# Patient Record
Sex: FEMALE | NOT HISPANIC OR LATINO | Employment: FULL TIME | URBAN - METROPOLITAN AREA
[De-identification: names, ages, dates, MRNs, and addresses within clinical notes are randomized per-mention and may not be internally consistent; named-entity substitution may affect disease eponyms.]

---

## 2018-10-17 ENCOUNTER — HOSPITAL ENCOUNTER (OUTPATIENT)
Dept: RADIOLOGY | Facility: MEDICAL CENTER | Age: 38
End: 2018-10-17

## 2018-10-17 ENCOUNTER — APPOINTMENT (OUTPATIENT)
Dept: RADIOLOGY | Facility: MEDICAL CENTER | Age: 38
DRG: 540 | End: 2018-10-17
Attending: INTERNAL MEDICINE
Payer: MEDICAID

## 2018-10-17 ENCOUNTER — HOSPITAL ENCOUNTER (INPATIENT)
Facility: MEDICAL CENTER | Age: 38
LOS: 4 days | DRG: 540 | End: 2018-10-21
Attending: HOSPITALIST | Admitting: HOSPITALIST
Payer: MEDICAID

## 2018-10-17 ENCOUNTER — HOSPITAL ENCOUNTER (OUTPATIENT)
Facility: MEDICAL CENTER | Age: 38
DRG: 540 | End: 2018-10-17
Admitting: HOSPITALIST
Payer: MEDICAID

## 2018-10-17 VITALS — BODY MASS INDEX: 36.51 KG/M2 | WEIGHT: 213.85 LBS | HEIGHT: 64 IN

## 2018-10-17 PROCEDURE — 770006 HCHG ROOM/CARE - MED/SURG/GYN SEMI*

## 2018-10-17 PROCEDURE — 94760 N-INVAS EAR/PLS OXIMETRY 1: CPT

## 2018-10-17 PROCEDURE — 700101 HCHG RX REV CODE 250: Performed by: INTERNAL MEDICINE

## 2018-10-17 PROCEDURE — 73130 X-RAY EXAM OF HAND: CPT | Mod: LT

## 2018-10-17 PROCEDURE — 700105 HCHG RX REV CODE 258: Performed by: INTERNAL MEDICINE

## 2018-10-17 PROCEDURE — 99223 1ST HOSP IP/OBS HIGH 75: CPT | Performed by: INTERNAL MEDICINE

## 2018-10-17 PROCEDURE — 99358 PROLONG SERVICE W/O CONTACT: CPT | Performed by: INTERNAL MEDICINE

## 2018-10-17 PROCEDURE — 700111 HCHG RX REV CODE 636 W/ 250 OVERRIDE (IP): Performed by: INTERNAL MEDICINE

## 2018-10-17 RX ORDER — OXYCODONE HYDROCHLORIDE 5 MG/1
5 TABLET ORAL
Status: DISCONTINUED | OUTPATIENT
Start: 2018-10-17 | End: 2018-10-21 | Stop reason: HOSPADM

## 2018-10-17 RX ORDER — AMOXICILLIN 250 MG
2 CAPSULE ORAL 2 TIMES DAILY
Status: DISCONTINUED | OUTPATIENT
Start: 2018-10-17 | End: 2018-10-21 | Stop reason: HOSPADM

## 2018-10-17 RX ORDER — SPIRONOLACTONE 25 MG/1
25 TABLET ORAL DAILY
COMMUNITY

## 2018-10-17 RX ORDER — SODIUM HYPOCHLORITE 1.25 MG/ML
SOLUTION TOPICAL ONCE
Status: DISCONTINUED | OUTPATIENT
Start: 2018-10-17 | End: 2018-10-17

## 2018-10-17 RX ORDER — LEVOTHYROXINE SODIUM 0.1 MG/1
200 TABLET ORAL
COMMUNITY

## 2018-10-17 RX ORDER — SODIUM CHLORIDE 9 MG/ML
500 INJECTION, SOLUTION INTRAVENOUS
Status: COMPLETED | OUTPATIENT
Start: 2018-10-17 | End: 2018-10-19

## 2018-10-17 RX ORDER — METRONIDAZOLE 500 MG/1
500 TABLET ORAL 3 TIMES DAILY
Status: ON HOLD | COMMUNITY
End: 2018-10-21

## 2018-10-17 RX ORDER — MORPHINE SULFATE 4 MG/ML
2 INJECTION, SOLUTION INTRAMUSCULAR; INTRAVENOUS
Status: DISCONTINUED | OUTPATIENT
Start: 2018-10-17 | End: 2018-10-21 | Stop reason: HOSPADM

## 2018-10-17 RX ORDER — OMEPRAZOLE 20 MG/1
20 CAPSULE, DELAYED RELEASE ORAL DAILY
COMMUNITY

## 2018-10-17 RX ORDER — SODIUM CHLORIDE 9 MG/ML
INJECTION, SOLUTION INTRAVENOUS CONTINUOUS
Status: DISCONTINUED | OUTPATIENT
Start: 2018-10-17 | End: 2018-10-21

## 2018-10-17 RX ORDER — DEXTROSE MONOHYDRATE 25 G/50ML
25 INJECTION, SOLUTION INTRAVENOUS
Status: DISCONTINUED | OUTPATIENT
Start: 2018-10-17 | End: 2018-10-20

## 2018-10-17 RX ORDER — POLYETHYLENE GLYCOL 3350 17 G/17G
1 POWDER, FOR SOLUTION ORAL
Status: DISCONTINUED | OUTPATIENT
Start: 2018-10-17 | End: 2018-10-21 | Stop reason: HOSPADM

## 2018-10-17 RX ORDER — LEVOTHYROXINE SODIUM 0.05 MG/1
200 TABLET ORAL
Status: DISCONTINUED | OUTPATIENT
Start: 2018-10-18 | End: 2018-10-21 | Stop reason: HOSPADM

## 2018-10-17 RX ORDER — OMEPRAZOLE 20 MG/1
20 CAPSULE, DELAYED RELEASE ORAL DAILY
Status: DISCONTINUED | OUTPATIENT
Start: 2018-10-18 | End: 2018-10-21 | Stop reason: HOSPADM

## 2018-10-17 RX ORDER — BISACODYL 10 MG
10 SUPPOSITORY, RECTAL RECTAL
Status: DISCONTINUED | OUTPATIENT
Start: 2018-10-17 | End: 2018-10-21 | Stop reason: HOSPADM

## 2018-10-17 RX ORDER — OXYCODONE HYDROCHLORIDE 5 MG/1
2.5 TABLET ORAL
Status: DISCONTINUED | OUTPATIENT
Start: 2018-10-17 | End: 2018-10-21 | Stop reason: HOSPADM

## 2018-10-17 RX ORDER — SODIUM HYPOCHLORITE 1.25 MG/ML
SOLUTION TOPICAL 2 TIMES DAILY
Status: DISCONTINUED | OUTPATIENT
Start: 2018-10-17 | End: 2018-10-21 | Stop reason: HOSPADM

## 2018-10-17 RX ORDER — CIPROFLOXACIN 2 MG/ML
400 INJECTION, SOLUTION INTRAVENOUS EVERY 12 HOURS
Status: DISCONTINUED | OUTPATIENT
Start: 2018-10-17 | End: 2018-10-18

## 2018-10-17 RX ORDER — ACETAMINOPHEN 325 MG/1
650 TABLET ORAL EVERY 6 HOURS PRN
Status: DISCONTINUED | OUTPATIENT
Start: 2018-10-17 | End: 2018-10-21 | Stop reason: HOSPADM

## 2018-10-17 RX ADMIN — SODIUM CHLORIDE: 9 INJECTION, SOLUTION INTRAVENOUS at 22:26

## 2018-10-17 RX ADMIN — CIPROFLOXACIN 400 MG: 2 INJECTION, SOLUTION INTRAVENOUS at 22:26

## 2018-10-17 RX ADMIN — MORPHINE SULFATE 2 MG: 4 INJECTION INTRAVENOUS at 22:26

## 2018-10-17 RX ADMIN — DAKIN'S SOLUTION 0.125% (QUARTER STRENGTH) 473 ML: 0.12 SOLUTION at 22:26

## 2018-10-17 ASSESSMENT — PATIENT HEALTH QUESTIONNAIRE - PHQ9
2. FEELING DOWN, DEPRESSED, IRRITABLE, OR HOPELESS: NOT AT ALL
SUM OF ALL RESPONSES TO PHQ9 QUESTIONS 1 AND 2: 0
1. LITTLE INTEREST OR PLEASURE IN DOING THINGS: NOT AT ALL

## 2018-10-17 ASSESSMENT — COGNITIVE AND FUNCTIONAL STATUS - GENERAL
MOBILITY SCORE: 24
SUGGESTED CMS G CODE MODIFIER DAILY ACTIVITY: CH
DAILY ACTIVITIY SCORE: 24
SUGGESTED CMS G CODE MODIFIER MOBILITY: CH

## 2018-10-17 ASSESSMENT — PAIN SCALES - GENERAL: PAINLEVEL_OUTOF10: 8

## 2018-10-17 ASSESSMENT — LIFESTYLE VARIABLES
ALCOHOL_USE: NO
EVER_SMOKED: YES

## 2018-10-17 ASSESSMENT — COPD QUESTIONNAIRES
COPD SCREENING SCORE: 0
IN THE PAST 12 MONTHS DO YOU DO LESS THAN YOU USED TO BECAUSE OF YOUR BREATHING PROBLEMS: DISAGREE/UNSURE
HAVE YOU SMOKED AT LEAST 100 CIGARETTES IN YOUR ENTIRE LIFE: NO/DON'T KNOW
DO YOU EVER COUGH UP ANY MUCUS OR PHLEGM?: NO/ONLY WITH OCCASIONAL COLDS OR INFECTIONS
DURING THE PAST 4 WEEKS HOW MUCH DID YOU FEEL SHORT OF BREATH: NONE/LITTLE OF THE TIME

## 2018-10-17 NOTE — PROGRESS NOTES
Patient is a direct admit from [facility], patient of Dr. Valadez's for osteomyelitis of left hand.  Dr. Nelson is accepting the patient.  Telephone ADT order received and entered into epic on 10/17.  Held orders in epic to be released upon patient arrival to unit.

## 2018-10-18 PROBLEM — E11.9 TYPE 2 DIABETES MELLITUS, WITHOUT LONG-TERM CURRENT USE OF INSULIN (HCC): Status: ACTIVE | Noted: 2018-10-18

## 2018-10-18 PROBLEM — K21.9 GERD (GASTROESOPHAGEAL REFLUX DISEASE): Status: ACTIVE | Noted: 2018-10-18

## 2018-10-18 PROBLEM — M86.9 OSTEOMYELITIS (HCC): Status: ACTIVE | Noted: 2018-10-18

## 2018-10-18 PROBLEM — E66.9 OBESITY: Status: ACTIVE | Noted: 2018-10-18

## 2018-10-18 PROBLEM — E03.9 HYPOTHYROIDISM: Status: ACTIVE | Noted: 2018-10-18

## 2018-10-18 LAB
ANION GAP SERPL CALC-SCNC: 8 MMOL/L (ref 0–11.9)
BASOPHILS # BLD AUTO: 0.5 % (ref 0–1.8)
BASOPHILS # BLD: 0.06 K/UL (ref 0–0.12)
BUN SERPL-MCNC: 9 MG/DL (ref 8–22)
CALCIUM SERPL-MCNC: 8.5 MG/DL (ref 8.5–10.5)
CHLORIDE SERPL-SCNC: 100 MMOL/L (ref 96–112)
CO2 SERPL-SCNC: 28 MMOL/L (ref 20–33)
CREAT SERPL-MCNC: 0.71 MG/DL (ref 0.5–1.4)
CRP SERPL HS-MCNC: 0.81 MG/DL (ref 0–0.75)
EOSINOPHIL # BLD AUTO: 0.41 K/UL (ref 0–0.51)
EOSINOPHIL NFR BLD: 3.2 % (ref 0–6.9)
ERYTHROCYTE [DISTWIDTH] IN BLOOD BY AUTOMATED COUNT: 40.6 FL (ref 35.9–50)
ERYTHROCYTE [SEDIMENTATION RATE] IN BLOOD BY WESTERGREN METHOD: 47 MM/HOUR (ref 0–20)
EST. AVERAGE GLUCOSE BLD GHB EST-MCNC: 295 MG/DL
GLUCOSE BLD-MCNC: 183 MG/DL (ref 65–99)
GLUCOSE BLD-MCNC: 196 MG/DL (ref 65–99)
GLUCOSE SERPL-MCNC: 154 MG/DL (ref 65–99)
HBA1C MFR BLD: 11.9 % (ref 0–5.6)
HCT VFR BLD AUTO: 35.3 % (ref 37–47)
HGB BLD-MCNC: 11.3 G/DL (ref 12–16)
IMM GRANULOCYTES # BLD AUTO: 0.12 K/UL (ref 0–0.11)
IMM GRANULOCYTES NFR BLD AUTO: 0.9 % (ref 0–0.9)
LYMPHOCYTES # BLD AUTO: 2.22 K/UL (ref 1–4.8)
LYMPHOCYTES NFR BLD: 17.4 % (ref 22–41)
MCH RBC QN AUTO: 28 PG (ref 27–33)
MCHC RBC AUTO-ENTMCNC: 32 G/DL (ref 33.6–35)
MCV RBC AUTO: 87.6 FL (ref 81.4–97.8)
MONOCYTES # BLD AUTO: 0.72 K/UL (ref 0–0.85)
MONOCYTES NFR BLD AUTO: 5.7 % (ref 0–13.4)
NEUTROPHILS # BLD AUTO: 9.21 K/UL (ref 2–7.15)
NEUTROPHILS NFR BLD: 72.3 % (ref 44–72)
NRBC # BLD AUTO: 0 K/UL
NRBC BLD-RTO: 0 /100 WBC
PLATELET # BLD AUTO: 312 K/UL (ref 164–446)
PMV BLD AUTO: 10.9 FL (ref 9–12.9)
POTASSIUM SERPL-SCNC: 3.5 MMOL/L (ref 3.6–5.5)
RBC # BLD AUTO: 4.03 M/UL (ref 4.2–5.4)
SODIUM SERPL-SCNC: 136 MMOL/L (ref 135–145)
T4 FREE SERPL-MCNC: 0.76 NG/DL (ref 0.53–1.43)
TSH SERPL DL<=0.005 MIU/L-ACNC: 72.39 UIU/ML (ref 0.38–5.33)
WBC # BLD AUTO: 12.7 K/UL (ref 4.8–10.8)

## 2018-10-18 PROCEDURE — 700102 HCHG RX REV CODE 250 W/ 637 OVERRIDE(OP): Performed by: INTERNAL MEDICINE

## 2018-10-18 PROCEDURE — 82962 GLUCOSE BLOOD TEST: CPT | Mod: 91

## 2018-10-18 PROCEDURE — 85025 COMPLETE CBC W/AUTO DIFF WBC: CPT

## 2018-10-18 PROCEDURE — 85652 RBC SED RATE AUTOMATED: CPT

## 2018-10-18 PROCEDURE — 80048 BASIC METABOLIC PNL TOTAL CA: CPT

## 2018-10-18 PROCEDURE — 36415 COLL VENOUS BLD VENIPUNCTURE: CPT

## 2018-10-18 PROCEDURE — 84443 ASSAY THYROID STIM HORMONE: CPT

## 2018-10-18 PROCEDURE — 700111 HCHG RX REV CODE 636 W/ 250 OVERRIDE (IP): Performed by: INTERNAL MEDICINE

## 2018-10-18 PROCEDURE — A9270 NON-COVERED ITEM OR SERVICE: HCPCS | Performed by: INTERNAL MEDICINE

## 2018-10-18 PROCEDURE — 99233 SBSQ HOSP IP/OBS HIGH 50: CPT | Performed by: INTERNAL MEDICINE

## 2018-10-18 PROCEDURE — 770006 HCHG ROOM/CARE - MED/SURG/GYN SEMI*

## 2018-10-18 PROCEDURE — 83036 HEMOGLOBIN GLYCOSYLATED A1C: CPT

## 2018-10-18 PROCEDURE — 700101 HCHG RX REV CODE 250: Performed by: ORTHOPAEDIC SURGERY

## 2018-10-18 PROCEDURE — 700105 HCHG RX REV CODE 258: Performed by: INTERNAL MEDICINE

## 2018-10-18 PROCEDURE — 86140 C-REACTIVE PROTEIN: CPT

## 2018-10-18 PROCEDURE — 99255 IP/OBS CONSLTJ NEW/EST HI 80: CPT | Performed by: INTERNAL MEDICINE

## 2018-10-18 PROCEDURE — 87040 BLOOD CULTURE FOR BACTERIA: CPT

## 2018-10-18 PROCEDURE — 84439 ASSAY OF FREE THYROXINE: CPT

## 2018-10-18 RX ORDER — SULFAMETHOXAZOLE AND TRIMETHOPRIM 800; 160 MG/1; MG/1
2 TABLET ORAL EVERY 12 HOURS
Status: DISCONTINUED | OUTPATIENT
Start: 2018-10-18 | End: 2018-10-19

## 2018-10-18 RX ORDER — AMOXICILLIN AND CLAVULANATE POTASSIUM 875; 125 MG/1; MG/1
1 TABLET, FILM COATED ORAL EVERY 12 HOURS
Status: DISCONTINUED | OUTPATIENT
Start: 2018-10-18 | End: 2018-10-19

## 2018-10-18 RX ORDER — METRONIDAZOLE 500 MG/1
500 TABLET ORAL 3 TIMES DAILY
Status: DISCONTINUED | OUTPATIENT
Start: 2018-10-18 | End: 2018-10-18

## 2018-10-18 RX ORDER — POTASSIUM CHLORIDE 20 MEQ/1
20 TABLET, EXTENDED RELEASE ORAL DAILY
Status: DISCONTINUED | OUTPATIENT
Start: 2018-10-18 | End: 2018-10-21 | Stop reason: HOSPADM

## 2018-10-18 RX ADMIN — CIPROFLOXACIN 400 MG: 2 INJECTION, SOLUTION INTRAVENOUS at 09:39

## 2018-10-18 RX ADMIN — MORPHINE SULFATE 2 MG: 4 INJECTION INTRAVENOUS at 04:06

## 2018-10-18 RX ADMIN — INSULIN HUMAN 1 UNITS: 100 INJECTION, SOLUTION PARENTERAL at 17:48

## 2018-10-18 RX ADMIN — LEVOTHYROXINE SODIUM 200 MCG: 50 TABLET ORAL at 05:17

## 2018-10-18 RX ADMIN — DAKIN'S SOLUTION 0.125% (QUARTER STRENGTH) 473 ML: 0.12 SOLUTION at 17:43

## 2018-10-18 RX ADMIN — INSULIN HUMAN 1 UNITS: 100 INJECTION, SOLUTION PARENTERAL at 23:30

## 2018-10-18 RX ADMIN — DAKIN'S SOLUTION 0.125% (QUARTER STRENGTH) 473 ML: 0.12 SOLUTION at 05:18

## 2018-10-18 RX ADMIN — OMEPRAZOLE 20 MG: 20 CAPSULE, DELAYED RELEASE ORAL at 05:17

## 2018-10-18 RX ADMIN — OXYCODONE HYDROCHLORIDE 5 MG: 5 TABLET ORAL at 17:43

## 2018-10-18 RX ADMIN — SODIUM CHLORIDE: 9 INJECTION, SOLUTION INTRAVENOUS at 09:39

## 2018-10-18 RX ADMIN — AMOXICILLIN AND CLAVULANATE POTASSIUM 1 TABLET: 875; 125 TABLET, FILM COATED ORAL at 17:43

## 2018-10-18 RX ADMIN — INSULIN HUMAN 1 UNITS: 100 INJECTION, SOLUTION PARENTERAL at 11:52

## 2018-10-18 RX ADMIN — POTASSIUM CHLORIDE 20 MEQ: 1500 TABLET, EXTENDED RELEASE ORAL at 09:39

## 2018-10-18 RX ADMIN — INSULIN HUMAN 1 UNITS: 100 INJECTION, SOLUTION PARENTERAL at 00:05

## 2018-10-18 RX ADMIN — SULFAMETHOXAZOLE AND TRIMETHOPRIM 2 TABLET: 800; 160 TABLET ORAL at 17:43

## 2018-10-18 ASSESSMENT — ENCOUNTER SYMPTOMS
SEIZURES: 0
DIZZINESS: 0
SENSORY CHANGE: 0
MEMORY LOSS: 0
MYALGIAS: 0
BLOOD IN STOOL: 0
NECK PAIN: 0
FEVER: 0
SPUTUM PRODUCTION: 0
PALPITATIONS: 0
BLURRED VISION: 0
VOMITING: 0
BACK PAIN: 0
HEADACHES: 0
SHORTNESS OF BREATH: 0
NAUSEA: 0
DEPRESSION: 0
SORE THROAT: 0
CHILLS: 0
COUGH: 0
DOUBLE VISION: 0
BRUISES/BLEEDS EASILY: 0
ABDOMINAL PAIN: 0
SPEECH CHANGE: 0
DIARRHEA: 0
MYALGIAS: 1
WEAKNESS: 1
FLANK PAIN: 0
DIAPHORESIS: 0
WHEEZING: 0
FOCAL WEAKNESS: 0
NERVOUS/ANXIOUS: 0

## 2018-10-18 ASSESSMENT — PAIN SCALES - GENERAL
PAINLEVEL_OUTOF10: 5
PAINLEVEL_OUTOF10: 5
PAINLEVEL_OUTOF10: 4
PAINLEVEL_OUTOF10: 8

## 2018-10-18 NOTE — DIETARY
"Nutrition services: Day 1 of admit.  Fabi Rudd is a 38 y.o. female with admitting DX of Osteomyelitis.    Unplanned wt loss noted on admit screen.  Spoke with pt at bedside.  Pt appeared well nourished and was sitting up in bed drawing. Pt reports a good appetite.  Pt unsure of UBW, but states that she feels her wt has been fluctuating 5-10 lbs based on how her clothing has been fitting.  Discussed snacks with pt and added to daily menu TID per pt request.  Also discussed the importance of maintaining steady CHO intake throughout the day to help maintain glucose control.     Assessment:  Height: 160 cm (5' 3\")  Weight: 100.6 kg (221 lb 12.5 oz)  Body mass index is 39.29 kg/m².  Diet/Intake: Diabetic, Regular; PO 50-75% for one meal thus far    Evaluation:   1. Potassium 3.5, Glucose 154, HbA1c 11.9 (10/18)  2. Pertinent meds:  Augmentin, SSI, Synthroid, Prilosec, Kdur, Pericolace    Recommendations/Plan:  1. Encourage intake of meals and snacks.  2. Document intake of all meals and snacks as % taken in ADLs to provide interdisciplinary communication across all shifts.   3. Monitor weight.  4. Nutrition rep will continue to see patient for ongoing meal and snack preferences.             "

## 2018-10-18 NOTE — PROGRESS NOTES
"Progress Note     Interval changes:improved.  Pain and swelling decreased.  Improvement in erythema.     ROS - Patient denies any new issues. No chest pain, dyspnea, or fever.  Pain well controlled.   /73   Pulse 66   Temp 36.4 °C (97.6 °F)   Resp 16   Ht 1.6 m (5' 3\")   Wt 100.6 kg (221 lb 12.5 oz)   LMP 09/15/2018 (Exact Date)   SpO2 97%   Breastfeeding? No   BMI 39.29 kg/m²      Patient seen and examined  No acute distress  Breathing non labored  RRR  L ring finger: dressing clean and dry.  Interval improvement in the erythema.  Wound shows no significant erythema, no purulence  +FDS/FDP  Recent Labs      10/18/18   0347   WBC  12.7*   RBC  4.03*   HEMOGLOBIN  11.3*   HEMATOCRIT  35.3*   MCV  87.6   MCH  28.0   RDW  40.6   PLATELETCT  312   MPV  10.9   NEUTSPOLYS  72.30*   LYMPHOCYTES  17.40*   MONOCYTES  5.70   EOSINOPHILS  3.20   BASOPHILS  0.50     Recent Labs      10/18/18   0347   SODIUM  136   POTASSIUM  3.5*   CHLORIDE  100   CO2  28   GLUCOSE  154*   BUN  9        A/P: Acute osteomyelitis of the distal phalanx  - pain control  BID dakins soaks  Wound care recommendations  ID recommendations  If she continues to improve, will not need further surgery.  Will need to establish wound care for patient on discharge.    NPO at midnight tonight.        Patient Active Problem List   Diagnosis   • Osteomyelitis (HCC)   • Type 2 diabetes mellitus, without long-term current use of insulin (HCC)   • Hypothyroidism   • Obesity   • GERD (gastroesophageal reflux disease)     "

## 2018-10-18 NOTE — ASSESSMENT & PLAN NOTE
Body mass index is 39.29 kg/m².  Pt educated on the increase of morbidity and mortality associated with excess weight including DM, Heart Disease, HTN, stroke, and sleep apnea.  Pt advised weight loss of 5% through reduced calorie, low carb diet and 150 mins of exercise a week

## 2018-10-18 NOTE — PROGRESS NOTES
Patient arrived to T407-1. Admitting notified. Consent to treat signed, armband placed on patient. PIV already established upon arrival, flushed and it is patent. Height and weight obtained. Admit profile and med rec completed. Dr. Mobley, admitting hospitalist, paged for direct admission.

## 2018-10-18 NOTE — CONSULTS
[facility] INFECTIOUS DISEASES INPATIENT CONSULT NOTE     Date of Service: 10/18/2018    Consult Requested By: Jessica Peter D.O.    Reason for Consultation: Finger osteomyelitis    Chief Complaint: Finger infection    History of Present Illness:     Fabi Rudd is a 38 y.o. female admitted 10/17/2018.  Patient has a history of poorly controlled diabetes.  Pt presented to [facility] on 10/12 with 2 weeks of right ring finger swelling that began with a hangnail.  PCP put her on amoxicillin which resulted in no improvement.  Patient reported that the swelling extended approximately up to a third of her forearm.  A limited I&D was performed in the ER on 10/12.  She refused surgery referral at the time.  MRI showed abscess of the finger with underlying osteomyelitis.    ER with a right ring finger abscess.  She reports that about 13 days ago, she noted a hangnail that was complicated by infection.  She reported that the swelling progressed proximally involving up to a third of her forearm.    Patient was seen by Ortho, noted that she may not need surgical intervention.  Patient prefers to not undergo amputation.  Wound culture from the outside hospital grew Staphylococcus hominis, methicillin-resistant, resistant to Augmentin, Unasyn, ceftriaxone.  Sensitive to Clinda, Dapto, Zyvox, tetracycline, Bactrim, vanc.    Patient was transferred to [facility] due to concern that she may need amputation.    Review of Systems:  All other systems reviewed and are negative expect as noted in HPI    Past Medical History:   Diagnosis Date   • Diabetes mellitus (HCC)        No past surgical history on file.    Family history  No similar illness in family    Social History     Social History   • Marital status: Single     Spouse name: N/A   • Number of children: N/A   • Years of education: N/A     Occupational History   • Not on file.     Social History Main Topics   • Smoking status: Former Smoker     Packs/day: 0.00    • Smokeless tobacco: Never Used   • Alcohol use Not on file   • Drug use: Unknown   • Sexual activity: Not on file     Other Topics Concern   • Not on file     Social History Narrative   • No narrative on file       No Known Allergies    Medications:    Current Facility-Administered Medications:   •  potassium chloride SA (Kdur) tablet 20 mEq, 20 mEq, Oral, DAILY, Jessica Peter D.O., 20 mEq at 10/18/18 0939  •  sulfamethoxazole-trimethoprim (BACTRIM DS) 800-160 MG tablet 2 Tab, 2 Tab, Oral, Q12HRS, Ignacio Long M.D.  •  amoxicillin-clavulanate (AUGMENTIN) 875-125 MG per tablet 1 Tab, 1 Tab, Oral, Q12HRS, Ignacio Long M.D.  •  senna-docusate (PERICOLACE or SENOKOT S) 8.6-50 MG per tablet 2 Tab, 2 Tab, Oral, BID **AND** polyethylene glycol/lytes (MIRALAX) PACKET 1 Packet, 1 Packet, Oral, QDAY PRN **AND** magnesium hydroxide (MILK OF MAGNESIA) suspension 30 mL, 30 mL, Oral, QDAY PRN **AND** bisacodyl (DULCOLAX) suppository 10 mg, 10 mg, Rectal, QDAY PRN, Isai Mobley M.D.  •  Respiratory Care per Protocol, , Nebulization, Continuous RT, Isai Mobley M.D.  •  NS infusion, , Intravenous, Continuous, Isai Mobley M.D., Last Rate: 100 mL/hr at 10/18/18 1200  •  NS (BOLUS) infusion 500 mL, 500 mL, Intravenous, Once PRN, Isai Mobley M.D.  •  acetaminophen (TYLENOL) tablet 650 mg, 650 mg, Oral, Q6HRS PRN, Isai Mobley M.D.  •  Notify provider if pain remains uncontrolled, , , CONTINUOUS **AND** Use the numeric rating scale (NRS-11) on regular floors and Critical-Care Pain Observation Tool (CPOT) on ICUs/Trauma to assess pain, , , CONTINUOUS **AND** Pulse Ox (Oximetry), , , CONTINUOUS **AND** Pharmacy Consult Request ...Pain Management Review, , Other, PRN **AND** If patient difficult to arouse and/or has respiratory depression, stop any opiates that are currently infusing and call a Rapid Response., , , CONTINUOUS **AND** oxyCODONE immediate-release (ROXICODONE) tablet 2.5 mg, 2.5 mg, Oral, Q3HRS PRN **AND**  "oxyCODONE immediate-release (ROXICODONE) tablet 5 mg, 5 mg, Oral, Q3HRS PRN **AND** morphine (pf) 4 mg/ml injection 2 mg, 2 mg, Intravenous, Q3HRS PRN, Isai Mobley M.D., 2 mg at 10/18/18 0406  •  insulin regular (HUMULIN R) injection 1-6 Units, 1-6 Units, Subcutaneous, Q6HRS, 1 Units at 10/18/18 1152 **AND** Accu-Chek Q6 if NPO, , , Q6H **AND** NOTIFY MD and PharmD, , , Once **AND** glucose 4 g chewable tablet 16 g, 16 g, Oral, Q15 MIN PRN **AND** dextrose 50% (D50W) injection 25 mL, 25 mL, Intravenous, Q15 MIN PRN, Isai Mobley M.D.  •  levothyroxine (SYNTHROID) tablet 200 mcg, 200 mcg, Oral, AM ES, Isai Mobley M.D., 200 mcg at 10/18/18 0517  •  omeprazole (PRILOSEC) capsule 20 mg, 20 mg, Oral, DAILY, Isai Mobley M.D., 20 mg at 10/18/18 0517  •  dakins 0.125% (1/4 strength) topical soln, , Topical, BID, Carlitos Levi M.D., 473 mL at 10/18/18 0518    Physical Exam:   Vital Signs: /73   Pulse 66   Temp 36.4 °C (97.6 °F)   Resp 16   Ht 1.6 m (5' 3\")   Wt 100.6 kg (221 lb 12.5 oz)   LMP 09/15/2018 (Exact Date)   SpO2 97%   Breastfeeding? No   BMI 39.29 kg/m²   Temp  Av.2 °C (97.2 °F)  Min: 36.1 °C (97 °F)  Max: 36.4 °C (97.6 °F)  Vital signs reviewed  Constitutional: Patient is oriented to person, place, and time. Appears well-developed and well-nourished. No distress  Head: Atraumatic, normocephalic  Eyes: Conjunctivae normal, EOM intact. Pupils are equal, round, and reactive to light.   Mouth/Throat: Lips without lesions, good dentition, oropharynx is clear and moist.  Neck: Neck supple. No masses/lymphadenopathy  Cardiovascular: Normal rate, regular rhythm, normal S1S2 and intact distal pulses. No murmur, gallop, or friction rub. No pedal edema.  Pulmonary/Chest: No respiratory distress. Unlabored respiratory effort, lungs clear to auscultation. No wheezes or rales.   Abdominal: Soft, non tender. BS + x 4. No masses or hepatosplenomegaly.   Musculoskeletal: Left ring finger with redness " and swelling extending up to PIP, TTP, 2 open wounds, largest over the distal phalanx, deep, eschar.  No active discharge but noted on dressing  Neurological: Alert and oriented to person, place, and time. No gross cranial nerve deficit. No focal neural deficit noted  Skin: Skin is warm and dry. No rashes or embolic phenomena noted on exposed skin  Psychiatric: Normal mood and affect. Behavior is normal.     LABS:  Recent Labs      10/18/18   0347   WBC  12.7*      Recent Labs      10/18/18   0347   HEMOGLOBIN  11.3*   HEMATOCRIT  35.3*   MCV  87.6   MCH  28.0   PLATELETCT  312       Recent Labs      10/18/18   0347   SODIUM  136   POTASSIUM  3.5*   CHLORIDE  100   CO2  28   CREATININE  0.71        No results for input(s): ALBUMIN in the last 72 hours.    Invalid input(s): AST, ALT, ALKPHOS, BILITOT, TOTALBILIRUB, BILIRUBINTOT, BILIRUBINDIR, BILIRUBININD, ALKALINEPHOS     MICRO:  No results found for: BLOODCULTU, BLDCULT, BCHOLD     Latest pertinent labs were reviewed    IMAGING STUDIES:  X-ray of the right ring finger with bony erosions of the distal phalanx    Hospital Course/Assessment:   Fabi Rudd is a 38 y.o. female with a history of diabetes, right ring finger abscess with underlying acute distal phalanx osteomyelitis.  Refuses amputation.  Outside wound cultures growing methicillin-resistant Staphylococcus hominis.    Plan:   -Since this is an acute osteomyelitis limited to the distal digit, will treat with 4-6 weeks of p.o. antibiotics to cover the grown organisms in addition to other common suspects and anaerobes  -Start p.o. Bactrim 2DS tabs BID and Augmentin 875 mg p.o. BID  -Stop date 11/29/2018  -If patient's infection worsens, she will need surgical source control  -Monitor renal function and potassium level on Bactrim    Plan was discussed with the primary team    ID will follow.  Please feel free to call with questions.    Ignacio Long M.D.

## 2018-10-18 NOTE — ASSESSMENT & PLAN NOTE
Osteomyelitis of distal phalanx of left ring finger  Hand surgery consulted, pt refusing amputation  Cont conservative treatment  per Dr. Levi    Previous wound culture grew staph hominis ,   ID consulted, IV abx while inpatient, will transition to oral on dc   blood culture neg  Pain control with oxycodone  Wound care consulted, continue Dakin's soak

## 2018-10-18 NOTE — CONSULTS
Orthopaedic Surgery Consult Note:    Carlitos Levi  Date & Time note created:    10/17/2018   10:08 PM     Referring MD:  Dr. Mobley    Patient ID:   Name:             Fabi Rudd     YOB: 1980  Age:                 38 y.o.  female   MRN:               [id number]                                                             Reason for Consult:   Left ring finger abscess    History of Present Illness:    37yo Type II DM female consulted on the hospital floor for a left ring finger abscess that started 12 days ago. The patient notes it started with a hangnail.   The patient was placed on antibiotics, and when it did not improve, it was lanced in the ED in [place].  After continuing to not improve, the patient was admitted and had a formal debridement today of the left ring finger distal phalanx and middle phalanx.  The patient had cultures taken and was sent to [facility] for further evaluation.  She notes the pain is substantially improved after the debridement.  No other pain at this time.  No fever, chills.      Review of Systems:      Constitutional: Denies fevers, Denies weight changes  Eyes: Denies changes in vision, no eye pain  Ears/Nose/Throat/Mouth: Denies nasal congestion or sore throat   Cardiovascular: Denies chest pain   Respiratory: Denies shortness of breath , Denies cough  Gastrointestinal/Hepatic: Denies abdominal pain, nausea, vomiting, diarrhea, constipation or GI bleeding   Genitourinary: Denies dysuria or frequency  Musculoskeletal/Rheum: left ring finger pain, no other pain, improved redness  Skin: Denies rash  Neurological: Denies headache, confusion, memory loss or focal weakness/parasthesias  Psychiatric: denies mood disorder   Endocrine: Rachana thyroid problems  Heme/Oncology/Lymph Nodes: Denies enlarged lymph nodes, denies brusing or known bleeding disorder  All other systems were reviewed and are negative (AMA/CMS criteria)                Past Medical History:   No past medical  history on file.  There are no active hospital problems to display for this patient.      Past Surgical History:  No past surgical history on file.    Hospital Medications:    Current Facility-Administered Medications:   •  senna-docusate (PERICOLACE or SENOKOT S) 8.6-50 MG per tablet 2 Tab, 2 Tab, Oral, BID **AND** polyethylene glycol/lytes (MIRALAX) PACKET 1 Packet, 1 Packet, Oral, QDAY PRN **AND** magnesium hydroxide (MILK OF MAGNESIA) suspension 30 mL, 30 mL, Oral, QDAY PRN **AND** bisacodyl (DULCOLAX) suppository 10 mg, 10 mg, Rectal, QDAY PRN, Isai Mobley M.D.  •  Respiratory Care per Protocol, , Nebulization, Continuous RT, Isai Mobley M.D.  •  NS infusion, , Intravenous, Continuous, Isai Mobley M.D.  •  NS (BOLUS) infusion 500 mL, 500 mL, Intravenous, Once PRN, Isai Mobley M.D.  •  acetaminophen (TYLENOL) tablet 650 mg, 650 mg, Oral, Q6HRS PRN, Isai Mobley M.D.  •  Notify provider if pain remains uncontrolled, , , CONTINUOUS **AND** Use the numeric rating scale (NRS-11) on regular floors and Critical-Care Pain Observation Tool (CPOT) on ICUs/Trauma to assess pain, , , CONTINUOUS **AND** Pulse Ox (Oximetry), , , CONTINUOUS **AND** Pharmacy Consult Request ...Pain Management Review, , Other, PRN **AND** If patient difficult to arouse and/or has respiratory depression, stop any opiates that are currently infusing and call a Rapid Response., , , CONTINUOUS **AND** oxyCODONE immediate-release (ROXICODONE) tablet 2.5 mg, 2.5 mg, Oral, Q3HRS PRN **AND** oxyCODONE immediate-release (ROXICODONE) tablet 5 mg, 5 mg, Oral, Q3HRS PRN **AND** morphine (pf) 4 mg/ml injection 2 mg, 2 mg, Intravenous, Q3HRS PRN, Isai Mobley M.D.  •  ciprofloxacin (CIPRO) ivpb 400 mg, 400 mg, Intravenous, Q12HRS, Isai Mobley M.D.  •  [START ON 10/18/2018] insulin regular (HUMULIN R) injection 1-6 Units, 1-6 Units, Subcutaneous, Q6HRS **AND** Accu-Chek Q6 if NPO, , , Q6H **AND** NOTIFY MD and PharmD, , , Once **AND** glucose 4 g  "chewable tablet 16 g, 16 g, Oral, Q15 MIN PRN **AND** dextrose 50% (D50W) injection 25 mL, 25 mL, Intravenous, Q15 MIN PRN, Isai Mobley M.D.  •  [START ON 10/18/2018] levothyroxine (SYNTHROID) tablet 200 mcg, 200 mcg, Oral, AM ES, Isai Mobley M.D.  •  [START ON 10/18/2018] omeprazole (PRILOSEC) capsule 20 mg, 20 mg, Oral, DAILY, Isai Mobley M.D.  •  dakins 0.125% (1/4 strength) topical soln, , Topical, Once, Isai Mobley M.D.    Current Outpatient Medications:  Prescriptions Prior to Admission   Medication Sig Dispense Refill Last Dose   • levothyroxine (SYNTHROID) 100 MCG Tab Take 200 mcg by mouth Every morning on an empty stomach.   10/17/2018 at 0600   • metFORMIN (GLUCOPHAGE) 500 MG Tab Take 1,000 mg by mouth 2 times a day, with meals.    at unknown   • omeprazole (PRILOSEC) 20 MG delayed-release capsule Take 20 mg by mouth every day. Before breakfast   10/17/2018 at 0600   • metroNIDAZOLE (FLAGYL) 500 MG Tab Take 500 mg by mouth 3 times a day. For 14 days    at unknown   • spironolactone (ALDACTONE) 25 MG Tab Take 25 mg by mouth every day.    at unknown       Medication Allergy:  No Known Allergies    Family History:  No family history on file.    Social History:  Social History     Social History   • Marital status: Single     Spouse name: N/A   • Number of children: N/A   • Years of education: N/A     Occupational History   • Not on file.     Social History Main Topics   • Smoking status: Not on file   • Smokeless tobacco: Not on file   • Alcohol use Not on file   • Drug use: Unknown   • Sexual activity: Not on file     Other Topics Concern   • Not on file     Social History Narrative   • No narrative on file         Physical Exam:  Vitals/ General Appearance:   Weight/BMI: Body mass index is 39.29 kg/m².  Blood pressure 139/77, pulse 68, temperature 36.2 °C (97.1 °F), resp. rate 20, height 1.6 m (5' 3\"), weight 100.6 kg (221 lb 12.5 oz), last menstrual period 09/15/2018, SpO2 96 %, not currently " "breastfeeding.  Vitals:    10/17/18 2020 10/17/18 2026   BP: 139/77    Pulse: 68    Resp: 20    Temp: 36.2 °C (97.1 °F)    SpO2: 96%    Weight:  100.6 kg (221 lb 12.5 oz)   Height:  1.6 m (5' 3\")       Constitutional:   Well developed, Well nourished, No acute distress  HENMT:  Normocephalic, Atraumatic, Oropharynx moist mucous membranes, No oral exudates, Nose normal.  No thyromegaly.  Eyes:  EOMI, Conjunctiva normal, No discharge.  Neck:  Normal range of motion, No cervical tenderness,  no JVD.  Cardiovascular:  Regular rate and rhythm  Lungs:  Normal breathing  Abdomen: Soft, non-tender, non-distended.  Skin: Warm, Dry, No erythema, No rash, no induration.  Neurologic: Alert & oriented x 3, No focal deficits noted, cranial nerves II through X are grossly intact.  Psychiatric: Affect normal, Judgment normal, Mood normal.  Musculoskeletal: left ring finger: incision over middle phalanx and distal phalanx noted.    +EDC/FDS/FDP intact.  Mild swelling, no purulent drainage at this time.  Capillary refill intact.  No phalanx exposed.   No pain in palm over flexor sheath. SILT r/u digital nerve    Lab Data Review:  No results found for this or any previous visit (from the past 24 hour(s)).    Imaging:   DX-HAND 3+ LEFT    (Results Pending)    MRI ( 10/15) hand reviewed by report only (awaiting loading) showing acute osteomyelitis of the ring finger distal phalanx, with abscesses over distal and middle phalanx.      Assessment: Left ring finger abscess s/p I and D of the ring finger    Plan:  ESR, cbc, CRP  Now  NPO at midnight  Begin BID soaks with dakins solution- dry dressing  Recommend wound care nursing for evaluation, and long term follow-up  Patient is very resistant to any attempt at amputation at this time.  Will try long term treatment for possible salvage.    Appreciate medicine/infection disease for management of IV antibiotics and DM related management.       Carlitos Levi M.D.  [facility] " Orthopaedics  [phone]

## 2018-10-18 NOTE — PROGRESS NOTES
Med rec complete per pt at bedside  Ok per Pt to discuss medications with visitor/s present  Allergies have been verified and updated    Pt is on a 14 day course of FLAGYL but does not remember when she started taking it.       Pt reports that she has been in the hospital in [place] for the last week.

## 2018-10-18 NOTE — PROGRESS NOTES
Dr. Levi was at bedside to assess wound on patient's finger. Received order to soak patient's finger in Dakin's solution for 20 minutes and place dry dressing.    Dressing change performed. Pt tolerated well. Medicated with Morphine PRN for pain.

## 2018-10-18 NOTE — ASSESSMENT & PLAN NOTE
on insulin sliding scale with serial Accu-Checks  Improving, increase metformin to 3 times a day  -hemoglobin A1c 11.9  Hypoglycemic protocol in place   may need insulin on dc

## 2018-10-18 NOTE — WOUND TEAM
Wound Team in to assess wound. Finger looks much better than in initial photo. The 1/4 strength Dakin's provides topical antimicrobial treatment and will chemically debride off slough and necrotic tissue. Wound Team will follow up again tomorrow and assess progress of wound.

## 2018-10-18 NOTE — PROGRESS NOTES
Report received from RN, assumed care at 0700  Pt is A0X4, and responds appropriately   Pt declines any SOB, chest pain, new onset of numbness/ tingiling  Pt rates pain at 4/10, on a scale of 1-10, pt declines pain and pain medication needs at this time  Pt is voiding adequatly and without hesitancy  Pt has + flatus, + bowel sounds,  BM on 10/17/2018  Pt ambulates with a steady gait up self   Pt is tolerating a regular diabetic diet, pt denies any nausea/vomiting  Pt has a dressing to left hand ring finger in place, dressing is clean,dry and intact  And was changed this AM by nursing staff and then was evaluated and changed agian by wound care nurse this AM   Plan of care discussed, all questions answered. Explained importance of calling before getting OOB and pt verbalizes understanding. Explained importance of oral care. Call light is within reach, treaded slipper socks on, bed in lowest/ locked position, hourly rounding in place, all needs met at this time

## 2018-10-18 NOTE — PROGRESS NOTES
[facility] Hospitalist Progress Note    Date of Service: 10/18/2018    Chief Complaint  38 y.o. female admitted 10/17/2018 with Left finger osteomyelitis and h/o uncontrolled DM.    Interval Problem Update  Pain controlled  S/p I and D      Consultants/Specialty  Hand surgery  ID    Disposition   TBD        Review of Systems   Constitutional: Negative for chills, diaphoresis, fever and malaise/fatigue.   HENT: Negative for congestion and hearing loss.    Eyes: Negative for blurred vision and double vision.   Respiratory: Negative for cough and shortness of breath.    Cardiovascular: Negative for chest pain, palpitations and leg swelling.   Gastrointestinal: Negative for abdominal pain, nausea and vomiting.   Genitourinary: Negative for dysuria and flank pain.   Musculoskeletal: Positive for myalgias. Negative for back pain and joint pain.   Neurological: Positive for weakness. Negative for dizziness, sensory change, speech change, focal weakness and headaches.   Psychiatric/Behavioral: Negative for depression and memory loss. The patient is not nervous/anxious.       Physical Exam  Laboratory/Imaging   Hemodynamics  Temp (24hrs), Av.2 °C (97.2 °F), Min:36.1 °C (97 °F), Max:36.4 °C (97.6 °F)   Temperature: 36.4 °C (97.6 °F)  Pulse  Av.3  Min: 66  Max: 68    Blood Pressure: 129/73      Respiratory      Respiration: 16, Pulse Oximetry: 97 %, O2 Daily Delivery Respiratory : Silicone Nasal Cannula     Work Of Breathing / Effort: Mild  RUL Breath Sounds: Clear, RML Breath Sounds: Clear, RLL Breath Sounds: Clear, JESSICA Breath Sounds: Clear, LLL Breath Sounds: Clear    Fluids    Intake/Output Summary (Last 24 hours) at 10/18/18 1218  Last data filed at 10/18/18 1200   Gross per 24 hour   Intake             1940 ml   Output              900 ml   Net             1040 ml       Nutrition  Orders Placed This Encounter   Procedures   • Diet Order Diabetic, Regular     Standing Status:   Standing     Number of Occurrences:   1      Order Specific Question:   Diet:     Answer:   Diabetic [3]     Order Specific Question:   Diet:     Answer:   Regular [1]     Physical Exam   Constitutional: She is oriented to person, place, and time. She appears well-nourished. No distress.   HENT:   Head: Normocephalic and atraumatic.   Nose: Nose normal.   Eyes: Pupils are equal, round, and reactive to light. EOM are normal. Right eye exhibits no discharge. Left eye exhibits no discharge. No scleral icterus.   Neck: Neck supple. No thyromegaly present.   Cardiovascular: Normal rate and intact distal pulses.    No murmur heard.  Pulmonary/Chest: Effort normal and breath sounds normal. No respiratory distress. She has no wheezes.   Abdominal: Soft. Bowel sounds are normal. She exhibits no distension. There is no tenderness.   Musculoskeletal: She exhibits edema and tenderness.   Neurological: She is alert and oriented to person, place, and time. No cranial nerve deficit. Coordination normal.   Skin: Skin is warm and dry. No rash noted. She is not diaphoretic. There is erythema.   Dressing c/d/i   Psychiatric: She has a normal mood and affect. Her behavior is normal. Judgment and thought content normal.   Nursing note and vitals reviewed.      Recent Labs      10/18/18   0347   WBC  12.7*   RBC  4.03*   HEMOGLOBIN  11.3*   HEMATOCRIT  35.3*   MCV  87.6   MCH  28.0   MCHC  32.0*   RDW  40.6   PLATELETCT  312   MPV  10.9     Recent Labs      10/18/18   0347   SODIUM  136   POTASSIUM  3.5*   CHLORIDE  100   CO2  28   GLUCOSE  154*   BUN  9   CREATININE  0.71   CALCIUM  8.5                      Assessment/Plan     Osteomyelitis (HCC)- (present on admission)   Assessment & Plan    Osteomyelitis of distal phalanx of left ring finger  Hand surgery consulted, pt refusing amputation  Cont conservative treatment  dakins soak, per Dr. Dooley    Previous wound culture grew staph hominis ,   ID consulted, appreciate input   blood culture neg  Pain control with  oxycodone  Wound care consulted        GERD (gastroesophageal reflux disease)- (present on admission)   Assessment & Plan    Continue omeprazole        Obesity- (present on admission)   Assessment & Plan    Body mass index is 39.29 kg/m².  Pt educated on the increase of morbidity and mortality associated with excess weight including DM, Heart Disease, HTN, stroke, and sleep apnea.  Pt advised weight loss of 5% through reduced calorie, low carb diet and 150 mins of exercise a week          Hypothyroidism- (present on admission)   Assessment & Plan    Check TSH  Continue Synthroid 200        Type 2 diabetes mellitus, without long-term current use of insulin (HCC)- (present on admission)   Assessment & Plan    on insulin sliding scale with serial Accu-Checks  -hemoglobin A1c 11.9  Hypoglycemic protocol in place  Resume metformin, may need insulin on dc              Quality-Core Measures   Reviewed items::  Labs reviewed, Medications reviewed and Radiology images reviewed  DVT prophylaxis - mechanical:  SCDs  Ulcer Prophylaxis::  Yes  Antibiotics:  Treating active infection/contamination beyond 24 hours perioperative coverage  Assessed for rehabilitation services:  Patient returned to prior level of function, rehabilitation not indicated at this time

## 2018-10-18 NOTE — H&P
Hospital Medicine History & Physical Note    Date of Service  10/17/2018    Primary Care Physician  No primary care provider on file.    Consultants  Kerrie Levi    Code Status  Full code    Chief Complaint  Left ring finger abscess    History of Presenting Illness  38 y.o. female with a past medical history of type 2 diabetes mellitus, hypothyroidism, hypertension who presented 10/17/2018 with left ring finger abscess.  The patient was transferred from [facility], I have reviewed the medical records from the transferring facility which are summarized as follows.  Patient presented to [facility] on 10/12 with complaints of infection of her left ring finger for the past 2 weeks which was progressively getting worse.  She was initially treated as outpatient with amoxicillin with no improvement of her symptoms.  In the ER she was found to have cellulitis and abscess of her left ring finger and underwent an I&D, cultures were obtained.  She was started on broad-spectrum IV antibiotics with IV vancomycin and IV Zosyn.  Initial blood work revealed WBC 15.4, hemoglobin 13.2, platelets 413, sodium 131, potassium 4.2, chloride 98, bicarb 25.6, anion gap 7, glucose 299, BUN 13, creatinine 1.28, AST 12, ALT 18, alk phos 115, albumin 3.1, total bili 0.3, CRP 6, lactic acid 1.3.  X-ray of the hand reveals soft tissue edema with no osseous abnormalities.  The patient had persistent swelling and infection of her finger therefore she underwent an MRI of left hand that revealed abscess along the volar aspect of fourth digit phalanx with underlying osteomyelitis of the fourth distal phalanx.  Wound cultures grew staph hominis which was resistant to Unasyn and sensitive to Cipro, Clinda and vancomycin.  Patient was started on IV ciprofloxacin.  Dr.Phipps Urbina from orthopedics was consulted and the patient underwent incision and drainage of left ring finger abscess on October 17.  Dr. Urbina  recommended transfer to [facility] for further evaluation by hand specialist.    At this time the patient reports her pain has improved after the debridement.  She denies any fevers or chills.  She reports taking metformin for her glucose but has not had good control of his sugars.    X-ray left hand interpreted by me reveals bony resorption of the distal phalanx of the ring finger consistent with osteomyelitis.  Soft tissue swelling of the ring finger noted.    Review of Systems  Review of Systems   Constitutional: Negative for chills, diaphoresis and fever.   HENT: Negative for hearing loss and sore throat.    Eyes: Negative for blurred vision.   Respiratory: Negative for cough, sputum production, shortness of breath and wheezing.    Cardiovascular: Negative for chest pain, palpitations and leg swelling.   Gastrointestinal: Negative for abdominal pain, blood in stool, diarrhea, nausea and vomiting.   Genitourinary: Negative for dysuria, flank pain and urgency.   Musculoskeletal: Negative for back pain, joint pain, myalgias and neck pain.   Skin: Negative for rash.        Swelling and pain of left ring finger   Neurological: Negative for dizziness, focal weakness, seizures and headaches.   Endo/Heme/Allergies: Does not bruise/bleed easily.   Psychiatric/Behavioral: Negative for suicidal ideas.   All other systems reviewed and are negative.      Past Medical History  Diabetes mellitus type 2, hypothyroidism, GERD and obesity    Surgical History  Incision and drainage of left ring finger    Family History  History reviewed.  No pertinent family history    Social History   Patient denies tobacco, alcohol or illicit drug use    Allergies  No Known Allergies    Medications  Prior to Admission Medications   Prescriptions Last Dose Informant Patient Reported? Taking?   levothyroxine (SYNTHROID) 100 MCG Tab 10/17/2018 at 0600  Yes Yes   Sig: Take 200 mcg by mouth Every morning on an empty stomach.   metFORMIN (GLUCOPHAGE) 500  MG Tab  at unknown  Yes Yes   Sig: Take 1,000 mg by mouth 2 times a day, with meals.   metroNIDAZOLE (FLAGYL) 500 MG Tab  at unknown  Yes Yes   Sig: Take 500 mg by mouth 3 times a day. For 14 days   omeprazole (PRILOSEC) 20 MG delayed-release capsule 10/17/2018 at 0600  Yes Yes   Sig: Take 20 mg by mouth every day. Before breakfast   spironolactone (ALDACTONE) 25 MG Tab  at unknown  Yes Yes   Sig: Take 25 mg by mouth every day.      Facility-Administered Medications: None       Physical Exam  Temp:  [36.2 °C (97.1 °F)] 36.2 °C (97.1 °F)  Pulse:  [68] 68  Resp:  [20] 20  BP: (139)/(77) 139/77    Physical Exam   Constitutional: She is oriented to person, place, and time. She appears well-developed and well-nourished. No distress.   Obese   HENT:   Head: Normocephalic and atraumatic.   Mouth/Throat: Oropharynx is clear and moist.   Eyes: Pupils are equal, round, and reactive to light. Conjunctivae are normal.   Neck: Neck supple. No thyromegaly present.   Cardiovascular: Normal rate, regular rhythm and normal heart sounds.    Pulmonary/Chest: Effort normal and breath sounds normal. No respiratory distress. She has no wheezes. She has no rales.   Abdominal: Soft. Bowel sounds are normal. She exhibits no distension. There is no tenderness. There is no rebound.   Musculoskeletal: She exhibits edema and tenderness.   Left ring finger swelling and no obvious purulence   Neurological: She is alert and oriented to person, place, and time. No cranial nerve deficit. Coordination normal.   Skin: Skin is warm and dry.   Psychiatric: She has a normal mood and affect. Her behavior is normal.   Nursing note and vitals reviewed.      Laboratory:          No results for input(s): ALTSGPT, ASTSGOT, ALKPHOSPHAT, TBILIRUBIN, DBILIRUBIN, GAMMAGT, AMYLASE, LIPASE, ALB, PREALBUMIN, GLUCOSE in the last 72 hours.              No results for input(s): TROPONINI in the last 72 hours.    Urinalysis:    No results found     Imaging:  US-FOREIGN  FILM ULTRASOUND   Final Result      OUTSIDE IMAGES-CT ABDOMEN /PELVIS   Final Result      OUTSIDE IMAGES-DX UPPER EXTREMITY, LEFT   Final Result      OUTSIDE IMAGES-MR EXTREMITY   Final Result      DX-HAND 3+ LEFT   Final Result         1.  Slight bony resorption and osteopenia of the distal phalanx of the ring finger, appearance favors osteomyelitis.   2.  Peripherally sclerotic centrally lytic lesions within the scaphoid, could are present bone cyst, giant cell tumor, or other bony lesion.   3.  Mild ulna minus variant            Assessment/Plan:  I anticipate this patient will require at least two midnights for appropriate medical management, necessitating inpatient admission.    Osteomyelitis (HCC)- (present on admission)   Assessment & Plan    Osteomyelitis of distal phalanx of left ring finger  Hand surgery has been consulted, plan for surgery in the morning.  N.p.o.  Previous wound culture grew staph hominis sensitive to ciprofloxacin.  I will start the patient on IV ciprofloxacin  Check blood culture  Pain control with oxycodone  Wound care consulted        GERD (gastroesophageal reflux disease)- (present on admission)   Assessment & Plan    Continue omeprazole        Obesity- (present on admission)   Assessment & Plan    Body mass index is 39.29 kg/m².  Pt educated on the increase of morbidity and mortality associated with excess weight including DM, Heart Disease, HTN, stroke, and sleep apnea.  Pt advised weight loss of 5% through reduced calorie, low carb diet and 150 mins of exercise a week          Hypothyroidism- (present on admission)   Assessment & Plan    Check TSH  Continue Synthroid 200        Type 2 diabetes mellitus, without long-term current use of insulin (HCC)- (present on admission)   Assessment & Plan    Start on insulin sliding scale with serial Accu-Checks  Check hemoglobin A1c  Hypoglycemic protocol in place                VTE prophylaxis: SCD    I spent a total of 35 minutes of non face  to face time performing additional research, reviewing medical records from transferring facility, discussing plan of care with other healthcare providers. Start time: 9 05 pm. End time: 9:40 pm

## 2018-10-18 NOTE — CARE PLAN
Problem: Safety  Goal: Will remain free from injury  Outcome: PROGRESSING AS EXPECTED  Safety precautions in place. Bed in locked/low position. 2 side rails up. Treaded socks. Call light in reach, calls appropriately. Hourly rounding practiced.    Problem: Infection  Goal: Will remain free from infection  Outcome: PROGRESSING AS EXPECTED  Monitoring VS. IV abx infusing. Labs ordered for patient. Dressing change performed for finger.     Problem: Pain Management  Goal: Pain level will decrease to patient's comfort goal  Outcome: PROGRESSING AS EXPECTED  Pain managed with Morphine PRN

## 2018-10-19 LAB
ANION GAP SERPL CALC-SCNC: 7 MMOL/L (ref 0–11.9)
BASOPHILS # BLD AUTO: 0.5 % (ref 0–1.8)
BASOPHILS # BLD: 0.06 K/UL (ref 0–0.12)
BUN SERPL-MCNC: 11 MG/DL (ref 8–22)
CALCIUM SERPL-MCNC: 9 MG/DL (ref 8.5–10.5)
CHLORIDE SERPL-SCNC: 102 MMOL/L (ref 96–112)
CO2 SERPL-SCNC: 27 MMOL/L (ref 20–33)
CREAT SERPL-MCNC: 0.71 MG/DL (ref 0.5–1.4)
EOSINOPHIL # BLD AUTO: 0.61 K/UL (ref 0–0.51)
EOSINOPHIL NFR BLD: 4.7 % (ref 0–6.9)
ERYTHROCYTE [DISTWIDTH] IN BLOOD BY AUTOMATED COUNT: 41 FL (ref 35.9–50)
GLUCOSE BLD-MCNC: 148 MG/DL (ref 65–99)
GLUCOSE BLD-MCNC: 180 MG/DL (ref 65–99)
GLUCOSE BLD-MCNC: 181 MG/DL (ref 65–99)
GLUCOSE BLD-MCNC: 181 MG/DL (ref 65–99)
GLUCOSE BLD-MCNC: 190 MG/DL (ref 65–99)
GLUCOSE BLD-MCNC: 229 MG/DL (ref 65–99)
GLUCOSE SERPL-MCNC: 211 MG/DL (ref 65–99)
HCT VFR BLD AUTO: 35.5 % (ref 37–47)
HGB BLD-MCNC: 11.9 G/DL (ref 12–16)
IMM GRANULOCYTES # BLD AUTO: 0.09 K/UL (ref 0–0.11)
IMM GRANULOCYTES NFR BLD AUTO: 0.7 % (ref 0–0.9)
LYMPHOCYTES # BLD AUTO: 2.05 K/UL (ref 1–4.8)
LYMPHOCYTES NFR BLD: 15.7 % (ref 22–41)
MCH RBC QN AUTO: 29.5 PG (ref 27–33)
MCHC RBC AUTO-ENTMCNC: 33.5 G/DL (ref 33.6–35)
MCV RBC AUTO: 88.1 FL (ref 81.4–97.8)
MONOCYTES # BLD AUTO: 0.74 K/UL (ref 0–0.85)
MONOCYTES NFR BLD AUTO: 5.7 % (ref 0–13.4)
NEUTROPHILS # BLD AUTO: 9.47 K/UL (ref 2–7.15)
NEUTROPHILS NFR BLD: 72.7 % (ref 44–72)
NRBC # BLD AUTO: 0 K/UL
NRBC BLD-RTO: 0 /100 WBC
PLATELET # BLD AUTO: 304 K/UL (ref 164–446)
PMV BLD AUTO: 11.1 FL (ref 9–12.9)
POTASSIUM SERPL-SCNC: 3.7 MMOL/L (ref 3.6–5.5)
RBC # BLD AUTO: 4.03 M/UL (ref 4.2–5.4)
SODIUM SERPL-SCNC: 136 MMOL/L (ref 135–145)
WBC # BLD AUTO: 13 K/UL (ref 4.8–10.8)

## 2018-10-19 PROCEDURE — 700102 HCHG RX REV CODE 250 W/ 637 OVERRIDE(OP): Performed by: INTERNAL MEDICINE

## 2018-10-19 PROCEDURE — 700111 HCHG RX REV CODE 636 W/ 250 OVERRIDE (IP): Performed by: INTERNAL MEDICINE

## 2018-10-19 PROCEDURE — A9270 NON-COVERED ITEM OR SERVICE: HCPCS | Performed by: INTERNAL MEDICINE

## 2018-10-19 PROCEDURE — 700101 HCHG RX REV CODE 250: Performed by: ORTHOPAEDIC SURGERY

## 2018-10-19 PROCEDURE — 700105 HCHG RX REV CODE 258: Performed by: INTERNAL MEDICINE

## 2018-10-19 PROCEDURE — 99232 SBSQ HOSP IP/OBS MODERATE 35: CPT | Performed by: INTERNAL MEDICINE

## 2018-10-19 PROCEDURE — 36415 COLL VENOUS BLD VENIPUNCTURE: CPT

## 2018-10-19 PROCEDURE — 770006 HCHG ROOM/CARE - MED/SURG/GYN SEMI*

## 2018-10-19 PROCEDURE — 82962 GLUCOSE BLOOD TEST: CPT | Mod: 91

## 2018-10-19 PROCEDURE — 85025 COMPLETE CBC W/AUTO DIFF WBC: CPT

## 2018-10-19 PROCEDURE — 80048 BASIC METABOLIC PNL TOTAL CA: CPT

## 2018-10-19 RX ADMIN — SENNOSIDES AND DOCUSATE SODIUM 2 TABLET: 8.6; 5 TABLET ORAL at 16:56

## 2018-10-19 RX ADMIN — DAKIN'S SOLUTION 0.125% (QUARTER STRENGTH) 473 ML: 0.12 SOLUTION at 16:57

## 2018-10-19 RX ADMIN — INSULIN HUMAN 1 UNITS: 100 INJECTION, SOLUTION PARENTERAL at 11:26

## 2018-10-19 RX ADMIN — SULFAMETHOXAZOLE AND TRIMETHOPRIM 2 TABLET: 800; 160 TABLET ORAL at 05:17

## 2018-10-19 RX ADMIN — MORPHINE SULFATE 2 MG: 4 INJECTION INTRAVENOUS at 05:16

## 2018-10-19 RX ADMIN — SENNOSIDES AND DOCUSATE SODIUM 2 TABLET: 8.6; 5 TABLET ORAL at 05:17

## 2018-10-19 RX ADMIN — AMPICILLIN SODIUM AND SULBACTAM SODIUM 3 G: 2; 1 INJECTION, POWDER, FOR SOLUTION INTRAMUSCULAR; INTRAVENOUS at 12:51

## 2018-10-19 RX ADMIN — METFORMIN HYDROCHLORIDE 850 MG: 850 TABLET ORAL at 16:56

## 2018-10-19 RX ADMIN — VANCOMYCIN HYDROCHLORIDE 2800 MG: 100 INJECTION, POWDER, LYOPHILIZED, FOR SOLUTION INTRAVENOUS at 14:59

## 2018-10-19 RX ADMIN — SODIUM CHLORIDE: 9 INJECTION, SOLUTION INTRAVENOUS at 05:16

## 2018-10-19 RX ADMIN — DAKIN'S SOLUTION 0.125% (QUARTER STRENGTH) 473 ML: 0.12 SOLUTION at 05:28

## 2018-10-19 RX ADMIN — POTASSIUM CHLORIDE 20 MEQ: 1500 TABLET, EXTENDED RELEASE ORAL at 05:17

## 2018-10-19 RX ADMIN — OMEPRAZOLE 20 MG: 20 CAPSULE, DELAYED RELEASE ORAL at 05:17

## 2018-10-19 RX ADMIN — INSULIN HUMAN 2 UNITS: 100 INJECTION, SOLUTION PARENTERAL at 05:24

## 2018-10-19 RX ADMIN — SODIUM CHLORIDE: 9 INJECTION, SOLUTION INTRAVENOUS at 21:46

## 2018-10-19 RX ADMIN — AMOXICILLIN AND CLAVULANATE POTASSIUM 1 TABLET: 875; 125 TABLET, FILM COATED ORAL at 05:17

## 2018-10-19 RX ADMIN — INSULIN HUMAN 1 UNITS: 100 INJECTION, SOLUTION PARENTERAL at 16:55

## 2018-10-19 RX ADMIN — LEVOTHYROXINE SODIUM 200 MCG: 50 TABLET ORAL at 05:16

## 2018-10-19 RX ADMIN — AMPICILLIN SODIUM AND SULBACTAM SODIUM 3 G: 2; 1 INJECTION, POWDER, FOR SOLUTION INTRAMUSCULAR; INTRAVENOUS at 21:46

## 2018-10-19 ASSESSMENT — PAIN SCALES - GENERAL
PAINLEVEL_OUTOF10: 0
PAINLEVEL_OUTOF10: 3
PAINLEVEL_OUTOF10: 3

## 2018-10-19 ASSESSMENT — ENCOUNTER SYMPTOMS
DIARRHEA: 0
NERVOUS/ANXIOUS: 0
WEAKNESS: 0
DEPRESSION: 0
HEADACHES: 0
MYALGIAS: 1
PALPITATIONS: 0
SHORTNESS OF BREATH: 0
COUGH: 0
MEMORY LOSS: 0
DIAPHORESIS: 0
FEVER: 0
BACK PAIN: 0
CHILLS: 0
FLANK PAIN: 0
ABDOMINAL PAIN: 0
FOCAL WEAKNESS: 0
NAUSEA: 0
WEAKNESS: 1
VOMITING: 0

## 2018-10-19 NOTE — PROGRESS NOTES
"Pharmacy Kinetics 38 y.o. female on vancomycin day # 1 10/19/2018    Currently on Vancomycin Loading Dose 2800mg at ~1300 on 10/19    Indication for Treatment: Right ring finger osteomyelitis    Pertinent history per medical record: Admitted on 10/17/2018 for ring finger osteomyelitis with uncontrolled T2DM, confirmed with MRI.  Initially treated at [facility] for infected hangnail that grew Staph hominis, methicillin-resistent, sensitive to clindamycin, daptomycin, zyvox, tetracycline, bactrim and vancomycin.  Patient transferred for potential amputation, however surgery consult and patient wishes to hold off on amputation at this time.  ID consulted and recommending 4-6 weeks of PO antibiotics, however due to slow clinical progression has escalated to IV therapy.  Anticipate resumption of Bactrim+Augmentin for outpatient therapy if clinical improvement on IV antibiotics.    Other antibiotics: Unasyn 3g q6hr   Bactrim 800/160mg q12hr (10/17-10/19)   Augmentin 875/125 q12hr (10/17-10/19)    Allergies: Patient has no known allergies.     List concerns for renal function: Obesity (BMI 44), T2DM (uncontrolled)    Pertinent cultures to date:   10/18 Blood Cx: NGTD  Tissue Cx: OSH ([place]): Staph hominis    Recent Labs      10/18/18   0347  10/19/18   0250   WBC  12.7*  13.0*   NEUTSPOLYS  72.30*  72.70*     Recent Labs      10/18/18   0347  10/19/18   0250   BUN  9  11   CREATININE  0.71  0.71     No results for input(s): VANCOTROUGH, VANCOPEAK, VANCORANDOM in the last 72 hours.  Intake/Output Summary (Last 24 hours) at 10/19/18 1217  Last data filed at 10/19/18 0930   Gross per 24 hour   Intake             3080 ml   Output             2450 ml   Net              630 ml      Blood pressure 150/96, pulse 85, temperature 36.5 °C (97.7 °F), resp. rate 18, height 1.6 m (5' 3\"), weight 111.3 kg (245 lb 6 oz), last menstrual period 09/15/2018, SpO2 96 %, not currently breastfeeding. Temp (24hrs), Av.3 °C " (97.4 °F), Min:36 °C (96.8 °F), Max:36.5 °C (97.7 °F)      A/P   1. Vancomycin dose change: none  2. Next vancomycin level: random 18hr level (ordered; 10/20 at 0700)  3. Goal trough: 12-16 mcg/mL  4. Comments: Empiric antibiotics for right finger osteomyelitis.  WBC remains elevated at 13 and afebrile overnight.  Will pulse dose due to body habitus and order 18hr trough.  Monitor for continue wound debridement, escalation to surgical intervention and clinical improvement on IV versus PO antibiotics.    Brayden Cruz, Alivia.D.

## 2018-10-19 NOTE — PROGRESS NOTES
"Progress Note     Interval changes:improved. Patient has less pain and less swelling.       ROS - Patient denies any new issues. No chest pain, dyspnea, or fever.  Pain well controlled.   /79   Pulse 73   Temp 36.3 °C (97.3 °F)   Resp 18   Ht 1.6 m (5' 3\")   Wt 111.3 kg (245 lb 6 oz)   LMP 09/15/2018 (Exact Date)   SpO2 93%   Breastfeeding? No   BMI 43.47 kg/m²      Patient seen and examined  No acute distress  Breathing non labored  RRR  Left ring finger: mild improvement, with less erythema.  Wounds starting to decrease in size, minimal swelling  Diminished erythema  Limited flexion and extension to stiffness, passive ROM to 60 degrees at mcp  SILT r/u digital nerve  Recent Labs      10/18/18   0347  10/19/18   0250   WBC  12.7*  13.0*   RBC  4.03*  4.03*   HEMOGLOBIN  11.3*  11.9*   HEMATOCRIT  35.3*  35.5*   MCV  87.6  88.1   MCH  28.0  29.5   RDW  40.6  41.0   PLATELETCT  312  304   MPV  10.9  11.1   NEUTSPOLYS  72.30*  72.70*   LYMPHOCYTES  17.40*  15.70*   MONOCYTES  5.70  5.70   EOSINOPHILS  3.20  4.70   BASOPHILS  0.50  0.50     Recent Labs      10/18/18   0347  10/19/18   0250   SODIUM  136  136   POTASSIUM  3.5*  3.7   CHLORIDE  100  102   CO2  28  27   GLUCOSE  154*  211*   BUN  9  11        A/P:S/P left ring finger I and D at OSH with left distal phalanx osteomyelitis  -patient is very resistant to amputation, and with appropriate treatment, we likely can get these wounds to heal  - continue wound care, appreciate their recommendations, and she will need them for d/c back to [place]  - pain control: improved  - NPO at midnight in case something needs done tomorrow  - continue antibiotics per ID     Patient Active Problem List   Diagnosis   • Osteomyelitis (HCC)   • Type 2 diabetes mellitus, without long-term current use of insulin (HCC)   • Hypothyroidism   • Obesity   • GERD (gastroesophageal reflux disease)     "

## 2018-10-19 NOTE — PROGRESS NOTES
Infectious Disease Progress Note    Author: Ignacio Long M.D. Date & Time of service: 10/19/2018  11:56 AM    Chief Complaint:  Follow-up of left ring finger osteomyelitis    Interval History:  10/19 afebrile, white count 13,000.  Patient reports feeling about the same.  Orthopedics watching.    Labs Reviewed, Medications Reviewed and Wound Reviewed.    Review of Systems:  Review of Systems   Constitutional: Negative for chills, diaphoresis and fever.   Respiratory: Negative for cough and shortness of breath.    Cardiovascular: Negative for chest pain and leg swelling.   Gastrointestinal: Negative for abdominal pain, diarrhea, nausea and vomiting.   Genitourinary: Negative for dysuria.   Skin: Negative for rash.   Neurological: Negative for weakness.   All other systems reviewed and are negative.      Hemodynamics:  Temp (24hrs), Av.3 °C (97.4 °F), Min:36 °C (96.8 °F), Max:36.5 °C (97.7 °F)  Temperature: 36.5 °C (97.7 °F)  Pulse  Av.8  Min: 66  Max: 85   Blood Pressure: 150/96 (RN notified)       Physical Exam:  Physical Exam   Constitutional: She is oriented to person, place, and time. She appears well-developed and well-nourished. No distress.   HENT:   Head: Normocephalic and atraumatic.   Nose: Nose normal.   Mouth/Throat: No oropharyngeal exudate.   Eyes: Pupils are equal, round, and reactive to light. Conjunctivae and EOM are normal. Right eye exhibits no discharge. Left eye exhibits no discharge. No scleral icterus.   Neck: Neck supple. No JVD present.   Cardiovascular: Normal rate, regular rhythm, normal heart sounds and intact distal pulses.  Exam reveals no gallop and no friction rub.    No murmur heard.  Pulmonary/Chest: Effort normal and breath sounds normal. No respiratory distress. She has no wheezes. She has no rales.   Abdominal: Soft. Bowel sounds are normal. She exhibits no distension. There is no tenderness. There is no rebound.   Musculoskeletal: Normal range of motion. She exhibits  no edema.   Left ring finger swollen red, open wound with eschar noted   Lymphadenopathy:     She has no cervical adenopathy.   Neurological: She is alert and oriented to person, place, and time.   No gross focal neural or cranial nerve deficit   Skin: Skin is warm and dry.   Psychiatric: She has a normal mood and affect. Her behavior is normal.   Pleasant   Nursing note and vitals reviewed.      Meds:    Current Facility-Administered Medications:   •  potassium chloride SA  •  sulfamethoxazole-trimethoprim  •  amoxicillin-clavulanate  •  senna-docusate **AND** polyethylene glycol/lytes **AND** magnesium hydroxide **AND** bisacodyl  •  Respiratory Care per Protocol  •  NS  •  acetaminophen  •  Notify provider if pain remains uncontrolled **AND** Use the numeric rating scale (NRS-11) on regular floors and Critical-Care Pain Observation Tool (CPOT) on ICUs/Trauma to assess pain **AND** Pulse Ox (Oximetry) **AND** Pharmacy Consult Request **AND** If patient difficult to arouse and/or has respiratory depression, stop any opiates that are currently infusing and call a Rapid Response. **AND** oxyCODONE immediate-release **AND** oxyCODONE immediate-release **AND** morphine injection  •  insulin regular **AND** Accu-Chek Q6 if NPO **AND** NOTIFY MD and PharmD **AND** glucose 4 g **AND** dextrose 50%  •  levothyroxine  •  omeprazole  •  dakins 0.125% (1/4 strength)    Labs:  Recent Labs      10/18/18   0347  10/19/18   0250   WBC  12.7*  13.0*   RBC  4.03*  4.03*   HEMOGLOBIN  11.3*  11.9*   HEMATOCRIT  35.3*  35.5*   MCV  87.6  88.1   MCH  28.0  29.5   RDW  40.6  41.0   PLATELETCT  312  304   MPV  10.9  11.1   NEUTSPOLYS  72.30*  72.70*   LYMPHOCYTES  17.40*  15.70*   MONOCYTES  5.70  5.70   EOSINOPHILS  3.20  4.70   BASOPHILS  0.50  0.50     Recent Labs      10/18/18   0347  10/19/18   0250   SODIUM  136  136   POTASSIUM  3.5*  3.7   CHLORIDE  100  102   CO2  28  27   GLUCOSE  154*  211*   BUN  9  11     Recent Labs       "10/18/18   0347  10/19/18   0250   CREATININE  0.71  0.71       Imaging:  Dx-hand 3+ Left    Result Date: 10/17/2018  10/17/2018 9:52 PM HISTORY/REASON FOR EXAM: Atraumatic Pain/Swelling/Deformity TECHNIQUE/EXAM DESCRIPTION:  AP, lateral, and oblique views of the LEFT hand. COMPARISON:  None. FINDINGS: There appears to be slight bony resorption of the ring finger tuft. Soft tissue swelling of the ring finger is seen. There is 5.5 mm and 2.1 mm peripherally sclerotic lytic lesion in the scaphoid. Mild ulna minus variant is seen.     1.  Slight bony resorption and osteopenia of the distal phalanx of the ring finger, appearance favors osteomyelitis. 2.  Peripherally sclerotic centrally lytic lesions within the scaphoid, could are present bone cyst, giant cell tumor, or other bony lesion. 3.  Mild ulna minus variant      Micro:  Results     Procedure Component Value Units Date/Time    BLOOD CULTURE [[id number]] Collected:  10/18/18 0347    Order Status:  Completed Specimen:  Blood from Peripheral Updated:  10/19/18 0753     Significant Indicator NEG     Source BLD     Site PERIPHERAL     Blood Culture No Growth    Note: Blood cultures are incubated for 5 days and  are monitored continuously.Positive blood cultures  are called to the RN and reported as soon as  they are identified.      Narrative:       Per Hospital Policy: Only change Specimen Src: to \"Line\" if  specified by physician order.    BLOOD CULTURE [[id number]] Collected:  10/18/18 0347    Order Status:  Completed Specimen:  Blood from Peripheral Updated:  10/19/18 0753     Significant Indicator NEG     Source BLD     Site PERIPHERAL     Blood Culture No Growth    Note: Blood cultures are incubated for 5 days and  are monitored continuously.Positive blood cultures  are called to the RN and reported as soon as  they are identified.      Narrative:       Per Hospital Policy: Only change Specimen Src: to \"Line\" if  specified by physician order.    "       Assessment:  Fabi Rudd is a 38 y.o. female with a history of diabetes, right ring finger abscess with underlying acute distal phalanx osteomyelitis.  Refuses amputation.  Outside wound cultures growing methicillin-resistant Staphylococcus hominis.    Active Hospital Problems    Diagnosis   • Osteomyelitis (HCC) [M86.9]   • Type 2 diabetes mellitus, without long-term current use of insulin (HCC) [E11.9]   • Hypothyroidism [E03.9]   • Obesity [E66.9]   • GERD (gastroesophageal reflux disease) [K21.9]     Plan:  Left ring finger osteomyelitis and abscess  -Since this is an acute osteomyelitis limited to the distal digit, will can treat with 4-6 weeks of p.o. antibiotics to cover the grown organisms in addition to other common suspects and anaerobes  -Will switch to IV antibiotics while inpatient given slow response -vancomycin and Unasyn 3 g every 6 hours  -Stop date 11/29/2018  -Monitor. If not improving, she will need surgical source control  -If getting discharged, switch to p.o. Bactrim 2DS tabs BID and Augmentin 875 mg p.o. BID    Diabetes  Poorly controlled, hemoglobin A1c of 11.9  Glucose control would be essential for wound healing and resolution of infection    Discussed with internal medicine.    ID will follow.  Please call with questions.

## 2018-10-19 NOTE — PROGRESS NOTES
"/73   Pulse 77   Temp 36 °C (96.8 °F)   Resp 18   Ht 1.6 m (5' 3\")   Wt 111.3 kg (245 lb 6 oz)   LMP 09/15/2018 (Exact Date)   SpO2 90%   Breastfeeding? No   BMI 43.47 kg/m²     Patient is A&Ox4.   Denies pain.   RAY, CMS intact, reports numbness and tingling to 4th digit on left hand.   Mobilizes with SBA, educated to call for assistance.   On RA, denies SOB and chest pain.   Normoactive BS x 4. Tolerating diet at this time. Aware of NPO status after midnight. Denies nausea/vomiting.   + flatus, - BM at this time. Pt is voiding adequately.   Dressing over 4th digit clean, dry, and intact. Dressing change due 10/19.   PIV running IVF  Updated on POC. Belongings and call light within reach. All needs met at this time.   "

## 2018-10-19 NOTE — PROGRESS NOTES
[facility] Hospitalist Progress Note    Date of Service: 10/19/2018    Chief Complaint  38 y.o. female admitted 10/17/2018 with Left finger osteomyelitis and h/o uncontrolled DM.    Interval Problem Update  Minimal discharge  Wound appears to be healing  No purulence noted    Consultants/Specialty  Hand surgery  ID    Disposition   TBD  IV abx        Review of Systems   Constitutional: Negative for chills, fever and malaise/fatigue.   HENT: Negative for congestion.    Respiratory: Negative for shortness of breath.    Cardiovascular: Negative for palpitations and leg swelling.   Gastrointestinal: Negative for abdominal pain and nausea.   Genitourinary: Negative for dysuria and flank pain.   Musculoskeletal: Positive for myalgias. Negative for back pain.   Neurological: Positive for weakness. Negative for focal weakness and headaches.   Psychiatric/Behavioral: Negative for depression and memory loss. The patient is not nervous/anxious.       Physical Exam  Laboratory/Imaging   Hemodynamics  Temp (24hrs), Av.3 °C (97.4 °F), Min:36 °C (96.8 °F), Max:36.5 °C (97.7 °F)   Temperature: 36.5 °C (97.7 °F)  Pulse  Av.8  Min: 66  Max: 85    Blood Pressure: 150/96 (RN notified)      Respiratory      Respiration: 18, Pulse Oximetry: 96 %        RUL Breath Sounds: Clear, RML Breath Sounds: Clear, RLL Breath Sounds: Diminished, JESSICA Breath Sounds: Clear, LLL Breath Sounds: Diminished    Fluids    Intake/Output Summary (Last 24 hours) at 10/19/18 1344  Last data filed at 10/19/18 1251   Gross per 24 hour   Intake             3780 ml   Output             2450 ml   Net             1330 ml       Nutrition  Orders Placed This Encounter   Procedures   • Diet Order Diabetic, Regular     Standing Status:   Standing     Number of Occurrences:   1     Order Specific Question:   Diet:     Answer:   Diabetic [3]     Order Specific Question:   Diet:     Answer:   Regular [1]   • Diet NPO     Standing Status:   Standing     Number of  Occurrences:   1     Order Specific Question:   Restrict to:     Answer:   Sips with Medications [3]     Physical Exam   Constitutional: She is oriented to person, place, and time. She appears well-nourished. No distress.   HENT:   Head: Normocephalic and atraumatic.   Mouth/Throat: No oropharyngeal exudate.   Eyes: Pupils are equal, round, and reactive to light. EOM are normal. No scleral icterus.   Neck: Neck supple.   Cardiovascular: Normal rate and intact distal pulses.    No murmur heard.  Pulmonary/Chest: Effort normal and breath sounds normal. No respiratory distress. She has no rales.   Abdominal: Soft. Bowel sounds are normal. She exhibits no distension. There is no tenderness.   Musculoskeletal: She exhibits edema and tenderness.   Neurological: She is alert and oriented to person, place, and time. No cranial nerve deficit. She exhibits normal muscle tone.   Skin: Skin is warm and dry. There is erythema.   Dressing c/d/i   Psychiatric: She has a normal mood and affect. Her behavior is normal. Judgment and thought content normal.   Nursing note and vitals reviewed.      Recent Labs      10/18/18   0347  10/19/18   0250   WBC  12.7*  13.0*   RBC  4.03*  4.03*   HEMOGLOBIN  11.3*  11.9*   HEMATOCRIT  35.3*  35.5*   MCV  87.6  88.1   MCH  28.0  29.5   MCHC  32.0*  33.5*   RDW  40.6  41.0   PLATELETCT  312  304   MPV  10.9  11.1     Recent Labs      10/18/18   0347  10/19/18   0250   SODIUM  136  136   POTASSIUM  3.5*  3.7   CHLORIDE  100  102   CO2  28  27   GLUCOSE  154*  211*   BUN  9  11   CREATININE  0.71  0.71   CALCIUM  8.5  9.0                      Assessment/Plan     Osteomyelitis (HCC)- (present on admission)   Assessment & Plan    Osteomyelitis of distal phalanx of left ring finger  Hand surgery consulted, pt refusing amputation  Cont conservative treatment  dakins soak, per Dr. Dooley    Previous wound culture grew staph hominis ,   ID consulted, IV abx while inpatient, will transition to oral on  dc   blood culture neg  Pain control with oxycodone  Wound care consulted        GERD (gastroesophageal reflux disease)- (present on admission)   Assessment & Plan    Continue omeprazole        Obesity- (present on admission)   Assessment & Plan    Body mass index is 39.29 kg/m².  Pt educated on the increase of morbidity and mortality associated with excess weight including DM, Heart Disease, HTN, stroke, and sleep apnea.  Pt advised weight loss of 5% through reduced calorie, low carb diet and 150 mins of exercise a week          Hypothyroidism- (present on admission)   Assessment & Plan    Check TSH  Continue Synthroid 200        Type 2 diabetes mellitus, without long-term current use of insulin (HCC)- (present on admission)   Assessment & Plan    on insulin sliding scale with serial Accu-Checks  -hemoglobin A1c 11.9  Hypoglycemic protocol in place  Resume metformin, may need insulin on dc              Quality-Core Measures   Reviewed items::  Labs reviewed, Medications reviewed and Radiology images reviewed  DVT prophylaxis - mechanical:  SCDs  Ulcer Prophylaxis::  Yes  Antibiotics:  Treating active infection/contamination beyond 24 hours perioperative coverage  Assessed for rehabilitation services:  Patient returned to prior level of function, rehabilitation not indicated at this time

## 2018-10-19 NOTE — PROGRESS NOTES
Report received from RN, assumed care at 0700  Pt is A0X4, and responds appropriately   Pt declines any SOB, chest pain, new onset of numbness/ tingiling  Pt rates pain at 2/10, on a scale of 1-10, pt declines pain and pain medication needs at this time  Pt is voiding adequatly and without hesitancy  Pt has + flatus, + bowel sounds,  BM on 10/17/2018   Pt ambulates with a steady gait up self   Pt is tolerating a regular diabetic diet, pt denies any nausea/vomiting, pt to be NPO at midnight incase of surgery   Pt has wound to left ring finger, dressing taken off by surgeon this AM, finger currently soaking in the dankins solution and dressing will be changed after per orders   Plan of care discussed, all questions answered. Explained importance of calling before getting OOB and pt verbalizes understanding. Explained importance of oral care. Call light is within reach, treaded slipper socks on, bed in lowest/ locked position, hourly rounding in place, all needs met at this time

## 2018-10-20 LAB
ANION GAP SERPL CALC-SCNC: 8 MMOL/L (ref 0–11.9)
BUN SERPL-MCNC: 9 MG/DL (ref 8–22)
CALCIUM SERPL-MCNC: 9.4 MG/DL (ref 8.5–10.5)
CHLORIDE SERPL-SCNC: 104 MMOL/L (ref 96–112)
CO2 SERPL-SCNC: 24 MMOL/L (ref 20–33)
CREAT SERPL-MCNC: 0.79 MG/DL (ref 0.5–1.4)
GLUCOSE BLD-MCNC: 155 MG/DL (ref 65–99)
GLUCOSE BLD-MCNC: 162 MG/DL (ref 65–99)
GLUCOSE BLD-MCNC: 173 MG/DL (ref 65–99)
GLUCOSE BLD-MCNC: 181 MG/DL (ref 65–99)
GLUCOSE SERPL-MCNC: 192 MG/DL (ref 65–99)
POTASSIUM SERPL-SCNC: 3.9 MMOL/L (ref 3.6–5.5)
SODIUM SERPL-SCNC: 136 MMOL/L (ref 135–145)
VANCOMYCIN SERPL-MCNC: 12.4 UG/ML

## 2018-10-20 PROCEDURE — 80048 BASIC METABOLIC PNL TOTAL CA: CPT

## 2018-10-20 PROCEDURE — 700101 HCHG RX REV CODE 250: Performed by: ORTHOPAEDIC SURGERY

## 2018-10-20 PROCEDURE — 770006 HCHG ROOM/CARE - MED/SURG/GYN SEMI*

## 2018-10-20 PROCEDURE — 99232 SBSQ HOSP IP/OBS MODERATE 35: CPT | Performed by: INTERNAL MEDICINE

## 2018-10-20 PROCEDURE — 36415 COLL VENOUS BLD VENIPUNCTURE: CPT

## 2018-10-20 PROCEDURE — 82962 GLUCOSE BLOOD TEST: CPT

## 2018-10-20 PROCEDURE — 700105 HCHG RX REV CODE 258: Performed by: INTERNAL MEDICINE

## 2018-10-20 PROCEDURE — 700111 HCHG RX REV CODE 636 W/ 250 OVERRIDE (IP): Performed by: INTERNAL MEDICINE

## 2018-10-20 PROCEDURE — A9270 NON-COVERED ITEM OR SERVICE: HCPCS | Performed by: INTERNAL MEDICINE

## 2018-10-20 PROCEDURE — 700102 HCHG RX REV CODE 250 W/ 637 OVERRIDE(OP): Performed by: INTERNAL MEDICINE

## 2018-10-20 PROCEDURE — 80202 ASSAY OF VANCOMYCIN: CPT

## 2018-10-20 RX ORDER — LABETALOL HYDROCHLORIDE 5 MG/ML
10 INJECTION, SOLUTION INTRAVENOUS EVERY 6 HOURS PRN
Status: DISCONTINUED | OUTPATIENT
Start: 2018-10-20 | End: 2018-10-21 | Stop reason: HOSPADM

## 2018-10-20 RX ORDER — LISINOPRIL 20 MG/1
20 TABLET ORAL
Status: DISCONTINUED | OUTPATIENT
Start: 2018-10-20 | End: 2018-10-21 | Stop reason: HOSPADM

## 2018-10-20 RX ORDER — DEXTROSE MONOHYDRATE 25 G/50ML
25 INJECTION, SOLUTION INTRAVENOUS
Status: DISCONTINUED | OUTPATIENT
Start: 2018-10-20 | End: 2018-10-21 | Stop reason: HOSPADM

## 2018-10-20 RX ADMIN — SODIUM CHLORIDE: 9 INJECTION, SOLUTION INTRAVENOUS at 16:47

## 2018-10-20 RX ADMIN — INSULIN HUMAN 1 UNITS: 100 INJECTION, SOLUTION PARENTERAL at 11:22

## 2018-10-20 RX ADMIN — LISINOPRIL 20 MG: 20 TABLET ORAL at 17:31

## 2018-10-20 RX ADMIN — SODIUM CHLORIDE: 9 INJECTION, SOLUTION INTRAVENOUS at 04:53

## 2018-10-20 RX ADMIN — AMPICILLIN SODIUM AND SULBACTAM SODIUM 3 G: 2; 1 INJECTION, POWDER, FOR SOLUTION INTRAMUSCULAR; INTRAVENOUS at 02:46

## 2018-10-20 RX ADMIN — DAKIN'S SOLUTION 0.125% (QUARTER STRENGTH) 473 ML: 0.12 SOLUTION at 05:22

## 2018-10-20 RX ADMIN — SENNOSIDES AND DOCUSATE SODIUM 2 TABLET: 8.6; 5 TABLET ORAL at 04:52

## 2018-10-20 RX ADMIN — INSULIN HUMAN 1 UNITS: 100 INJECTION, SOLUTION PARENTERAL at 00:18

## 2018-10-20 RX ADMIN — METFORMIN HYDROCHLORIDE 850 MG: 850 TABLET ORAL at 18:00

## 2018-10-20 RX ADMIN — METFORMIN HYDROCHLORIDE 850 MG: 850 TABLET ORAL at 11:30

## 2018-10-20 RX ADMIN — INSULIN HUMAN 1 UNITS: 100 INJECTION, SOLUTION PARENTERAL at 05:01

## 2018-10-20 RX ADMIN — LEVOTHYROXINE SODIUM 200 MCG: 50 TABLET ORAL at 04:52

## 2018-10-20 RX ADMIN — METFORMIN HYDROCHLORIDE 850 MG: 850 TABLET ORAL at 08:24

## 2018-10-20 RX ADMIN — VANCOMYCIN HYDROCHLORIDE 1700 MG: 100 INJECTION, POWDER, LYOPHILIZED, FOR SOLUTION INTRAVENOUS at 16:00

## 2018-10-20 RX ADMIN — INSULIN HUMAN 1 UNITS: 100 INJECTION, SOLUTION PARENTERAL at 17:39

## 2018-10-20 RX ADMIN — AMPICILLIN SODIUM AND SULBACTAM SODIUM 3 G: 2; 1 INJECTION, POWDER, FOR SOLUTION INTRAMUSCULAR; INTRAVENOUS at 08:24

## 2018-10-20 RX ADMIN — AMPICILLIN SODIUM AND SULBACTAM SODIUM 3 G: 2; 1 INJECTION, POWDER, FOR SOLUTION INTRAMUSCULAR; INTRAVENOUS at 20:49

## 2018-10-20 RX ADMIN — POTASSIUM CHLORIDE 20 MEQ: 1500 TABLET, EXTENDED RELEASE ORAL at 04:53

## 2018-10-20 RX ADMIN — METOPROLOL TARTRATE 25 MG: 25 TABLET, FILM COATED ORAL at 18:42

## 2018-10-20 RX ADMIN — AMPICILLIN SODIUM AND SULBACTAM SODIUM 3 G: 2; 1 INJECTION, POWDER, FOR SOLUTION INTRAMUSCULAR; INTRAVENOUS at 14:23

## 2018-10-20 RX ADMIN — OMEPRAZOLE 20 MG: 20 CAPSULE, DELAYED RELEASE ORAL at 04:53

## 2018-10-20 RX ADMIN — DAKIN'S SOLUTION 0.125% (QUARTER STRENGTH) 473 ML: 0.12 SOLUTION at 16:47

## 2018-10-20 ASSESSMENT — ENCOUNTER SYMPTOMS
NERVOUS/ANXIOUS: 0
ABDOMINAL PAIN: 0
DIAPHORESIS: 0
NAUSEA: 0
WEAKNESS: 1
FEVER: 0
DIZZINESS: 0
DIARRHEA: 0
FLANK PAIN: 0
VOMITING: 0
COUGH: 0
WEAKNESS: 0
MYALGIAS: 1
HEADACHES: 0
SHORTNESS OF BREATH: 0
CHILLS: 0
MEMORY LOSS: 0

## 2018-10-20 ASSESSMENT — PAIN SCALES - GENERAL
PAINLEVEL_OUTOF10: 0

## 2018-10-20 NOTE — CARE PLAN
Problem: Safety  Goal: Will remain free from injury  Outcome: PROGRESSING AS EXPECTED  Safety precautions in place. Bed in locked/low position. 2 side rails up. Treaded socks. Call light in reach, calls appropriately. Hourly rounding practiced.    Problem: Venous Thromboembolism (VTW)/Deep Vein Thrombosis (DVT) Prevention:  Goal: Patient will participate in Venous Thrombosis (VTE)/Deep Vein Thrombosis (DVT)Prevention Measures  Outcome: PROGRESSING AS EXPECTED  BLE swelling noted. Pt not on pharmacologic prophylaxis. Encouraged to ambulate and to elevate lower extremities. SCDs in place.

## 2018-10-20 NOTE — PROGRESS NOTES
[facility] Hospitalist Progress Note    Date of Service: 10/20/2018    Chief Complaint  38 y.o. female admitted 10/17/2018 with Left finger osteomyelitis and h/o uncontrolled DM.    Interval Problem Update  Improving finger wound  Minimal discharge    Consultants/Specialty  Hand surgery  ID    Disposition   TBD  IV abx  imProving blood sugar, adjust metformin        Review of Systems   Constitutional: Negative for chills and fever.   HENT: Negative for congestion.    Respiratory: Negative for cough.    Cardiovascular: Negative for chest pain and leg swelling.   Gastrointestinal: Negative for abdominal pain and vomiting.   Genitourinary: Negative for dysuria and flank pain.   Musculoskeletal: Positive for myalgias.   Neurological: Positive for weakness. Negative for dizziness and headaches.   Psychiatric/Behavioral: Negative for memory loss. The patient is not nervous/anxious.       Physical Exam  Laboratory/Imaging   Hemodynamics  Temp (24hrs), Av.6 °C (97.8 °F), Min:36.3 °C (97.4 °F), Max:36.7 °C (98 °F)   Temperature: 36.7 °C (98 °F)  Pulse  Av.6  Min: 66  Max: 90    Blood Pressure: 146/92      Respiratory      Respiration: 16, Pulse Oximetry: 97 %        RUL Breath Sounds: Clear, RML Breath Sounds: Clear, RLL Breath Sounds: Diminished, JESSICA Breath Sounds: Clear, LLL Breath Sounds: Diminished    Fluids    Intake/Output Summary (Last 24 hours) at 10/20/18 1325  Last data filed at 10/20/18 0800   Gross per 24 hour   Intake             2740 ml   Output             1600 ml   Net             1140 ml       Nutrition  Orders Placed This Encounter   Procedures   • Diet Order Diabetic, Regular     Standing Status:   Standing     Number of Occurrences:   1     Order Specific Question:   Diet:     Answer:   Diabetic [3]     Order Specific Question:   Diet:     Answer:   Regular [1]     Physical Exam   Constitutional: She is oriented to person, place, and time. No distress.   HENT:   Head: Normocephalic and atraumatic.    Eyes: Pupils are equal, round, and reactive to light. EOM are normal.   Neck: Neck supple.   Cardiovascular: Normal rate and intact distal pulses.    Pulmonary/Chest: Effort normal and breath sounds normal. No respiratory distress. She has no wheezes.   Abdominal: Soft. Bowel sounds are normal. She exhibits no distension.   Musculoskeletal: She exhibits edema and tenderness.   Neurological: She is alert and oriented to person, place, and time. No cranial nerve deficit. Coordination normal.   Skin: Skin is warm and dry. She is not diaphoretic. There is erythema. No pallor.   Dressing c/d/i   Psychiatric: She has a normal mood and affect. Her behavior is normal. Judgment and thought content normal.   Nursing note and vitals reviewed.      Recent Labs      10/18/18   0347  10/19/18   0250   WBC  12.7*  13.0*   RBC  4.03*  4.03*   HEMOGLOBIN  11.3*  11.9*   HEMATOCRIT  35.3*  35.5*   MCV  87.6  88.1   MCH  28.0  29.5   MCHC  32.0*  33.5*   RDW  40.6  41.0   PLATELETCT  312  304   MPV  10.9  11.1     Recent Labs      10/18/18   0347  10/19/18   0250  10/20/18   0705   SODIUM  136  136  136   POTASSIUM  3.5*  3.7  3.9   CHLORIDE  100  102  104   CO2  28  27  24   GLUCOSE  154*  211*  192*   BUN  9  11  9   CREATININE  0.71  0.71  0.79   CALCIUM  8.5  9.0  9.4                      Assessment/Plan     Osteomyelitis (HCC)- (present on admission)   Assessment & Plan    Osteomyelitis of distal phalanx of left ring finger  Hand surgery consulted, pt refusing amputation  Cont conservative treatment  per Dr. Levi    Previous wound culture grew staph hominis ,   ID consulted, IV abx while inpatient, will transition to oral on dc   blood culture neg  Pain control with oxycodone  Wound care consulted, continue Dakin's soak        GERD (gastroesophageal reflux disease)- (present on admission)   Assessment & Plan    Continue omeprazole        Obesity- (present on admission)   Assessment & Plan    Body mass index is 39.29  kg/m².  Pt educated on the increase of morbidity and mortality associated with excess weight including DM, Heart Disease, HTN, stroke, and sleep apnea.  Pt advised weight loss of 5% through reduced calorie, low carb diet and 150 mins of exercise a week          Hypothyroidism- (present on admission)   Assessment & Plan    Check TSH  Continue Synthroid 200        Type 2 diabetes mellitus, without long-term current use of insulin (HCC)- (present on admission)   Assessment & Plan    on insulin sliding scale with serial Accu-Checks  Improving, increase metformin to 3 times a day  -hemoglobin A1c 11.9  Hypoglycemic protocol in place   may need insulin on dc              Quality-Core Measures   Reviewed items::  Labs reviewed, Medications reviewed and Radiology images reviewed  DVT prophylaxis - mechanical:  SCDs  Ulcer Prophylaxis::  Yes  Antibiotics:  Treating active infection/contamination beyond 24 hours perioperative coverage  Assessed for rehabilitation services:  Patient returned to prior level of function, rehabilitation not indicated at this time

## 2018-10-20 NOTE — PROGRESS NOTES
"/95   Pulse 87   Temp 36.7 °C (98 °F)   Resp 18   Ht 1.6 m (5' 3\")   Wt 111.3 kg (245 lb 6 oz)   LMP 09/15/2018 (Exact Date)   SpO2 95%   Breastfeeding? No   BMI 43.47 kg/m²     Patient is A&Ox4.   Denies pain.   RAY, CMS intact, reports numbness and tingling to left finger (4th digit).   Mobilizes independently with steady gait, calls for assistance.   On RA, denies SOB or chest pain.   Normoactive BS x 4. Tolerating diabetic regular diet. Denies nausea/vomiting.   + flatus, LBM 10/19. Pt is voiding adequately.   Dressing over left hand is clean, dry, and intact.   PIV running IVF  Updated on POC. Belongings and call light within reach. All needs met at this time.   "

## 2018-10-20 NOTE — PROGRESS NOTES
"Progress Note     Interval changes:improved.  Patient notes swelling and pain substantially improved.      ROS - Patient denies any new issues. No chest pain, dyspnea, or fever.  Pain well controlled.   /89   Pulse 90   Temp 36.3 °C (97.4 °F)   Resp 18   Ht 1.6 m (5' 3\")   Wt 111.3 kg (245 lb 6 oz)   LMP 09/15/2018 (Exact Date)   SpO2 97%   Breastfeeding? No   BMI 43.47 kg/m²      Patient seen and examined  No acute distress  Breathing non labored  RRR  Left ring figner: decreased erythema, no drainage, no fluctuance. Decreased swelling over proximal phalanx.    SILT r/u digital nerve  Stable wound over distal phalanx  wwp finger  Recent Labs      10/18/18   0347  10/19/18   0250   WBC  12.7*  13.0*   RBC  4.03*  4.03*   HEMOGLOBIN  11.3*  11.9*   HEMATOCRIT  35.3*  35.5*   MCV  87.6  88.1   MCH  28.0  29.5   RDW  40.6  41.0   PLATELETCT  312  304   MPV  10.9  11.1   NEUTSPOLYS  72.30*  72.70*   LYMPHOCYTES  17.40*  15.70*   MONOCYTES  5.70  5.70   EOSINOPHILS  3.20  4.70   BASOPHILS  0.50  0.50     Recent Labs      10/18/18   0347  10/19/18   0250   SODIUM  136  136   POTASSIUM  3.5*  3.7   CHLORIDE  100  102   CO2  28  27   GLUCOSE  154*  211*   BUN  9  11   xray: noted the xray with acute osteomyelitis, consistent with MRI from [place].     A/P: Left ring finger osteomyelitis, s/p I and D  - continue wound care  -PO antibiotics  Pain control  No surgical intervention at this time.   Would recommend wound care in [place], along with nutritionist for diabetes guidance  -Can follow up with me in 1-2 weeks, or with someone in [place].   At this time, ok for DC when comfortable from other teams involved.   -Please call with questions         Patient Active Problem List   Diagnosis   • Osteomyelitis (HCC)   • Type 2 diabetes mellitus, without long-term current use of insulin (HCC)   • Hypothyroidism   • Obesity   • GERD (gastroesophageal reflux disease)     "

## 2018-10-20 NOTE — PROGRESS NOTES
"Pharmacy Kinetics 38 y.o. female on vancomycin day # 2 10/20/2018    Currently on Vancomycin pulse dosinmg at ~1300 on 10/19    Indication for Treatment: right ring finger osteomyelitis    Pertinent history per medical record: Admitted on 10/17/2018 for ring finger osteomyelitis with uncontrolled T2DM, confirmed with MRI.  Initially treated at [facility] for infected hangnail that grew Staph hominis, methicillin-resistent, sensitive to clindamycin, daptomycin, zyvox, tetracycline, bactrim and vancomycin.  Patient transferred for potential amputation, however surgery consult and patient wishes to hold off on amputation at this time.  ID consulted and recommending 4-6 weeks of PO antibiotics, however due to slow clinical progression has escalated to IV therapy.  Anticipate resumption of Bactrim+Augmentin for outpatient therapy if clinical improvement on IV antibiotics.     Other antibiotics: Unasyn 3g q6hr              Bactrim 800/160mg q12hr (10/17-10/19)              Augmentin 875/125 q12hr (10/17-10/19)     Allergies: Patient has no known allergies.      List concerns for renal function: Obesity (BMI 44), T2DM (uncontrolled)     Pertinent cultures to date:   10/18 Blood Cx: NGTD  Tissue Cx: OSH ([place]): Staph hominis    Recent Labs      10/18/18   0347  10/19/18   0250   WBC  12.7*  13.0*   NEUTSPOLYS  72.30*  72.70*     Recent Labs      10/18/18   0347  10/19/18   0250  10/20/18   0705   BUN  9  11  9   CREATININE  0.71  0.71  0.79     Recent Labs      10/20/18   0705   VANCORANDOM  12.4     Intake/Output Summary (Last 24 hours) at 10/20/18 1357  Last data filed at 10/20/18 0800   Gross per 24 hour   Intake             2740 ml   Output             1600 ml   Net             1140 ml      Blood pressure 146/92, pulse 85, temperature 36.7 °C (98 °F), resp. rate 16, height 1.6 m (5' 3\"), weight 111.3 kg (245 lb 6 oz), last menstrual period 09/15/2018, SpO2 97 %, not currently breastfeeding. Temp " (24hrs), Av.6 °C (97.8 °F), Min:36.3 °C (97.4 °F), Max:36.7 °C (98 °F)      A/P   1. Vancomycin dose change: begin 1700mg (15mg/kg) q24hr (1430)  2. Next vancomycin level: prior to 3rd dose (1330 at 10/21; ordered)  3. Goal trough: 12-16 mcg/mL  4. Comments: Surgery consulted will not be doing further revision while inpatient.  Current plan of care is to continue IV antibiotics while inpatient and transition to PO on discharge.  Random 18hr level shows patient appropriately clearing medication.  Will dose q24hr and check a level prior to 3rd dose.    Brayden Cruz, Pharm.D.

## 2018-10-20 NOTE — PROGRESS NOTES
Report received from RN, assumed care at 0700  Pt is A0X4, and responds appropriately   Pt declines any SOB, chest pain, new onset of numbness/ tingiling  Pt rates pain at 0/10, on a scale of 1-10, pt declines pain and pain medication needs at this time  Pt is voiding adequatly and without hesitancy  Pt has + flatus, + bowel sounds,  BM on 10/20/2018   Pt ambulates with a steady gait up self   Pt is tolerating a regular diabetic diet, pt denies any nausea/vomiting  Pt has wound to left ring finger, dressing taken off by surgeon this AM, finger, finger redressed     Plan of care discussed, all questions answered. Explained importance of calling before getting OOB and pt verbalizes understanding. Explained importance of oral care. Call light is within reach, treaded slipper socks on, bed in lowest/ locked position, hourly rounding in place, all needs met at this time

## 2018-10-21 VITALS
HEIGHT: 63 IN | SYSTOLIC BLOOD PRESSURE: 108 MMHG | DIASTOLIC BLOOD PRESSURE: 88 MMHG | TEMPERATURE: 98 F | WEIGHT: 245.37 LBS | HEART RATE: 76 BPM | OXYGEN SATURATION: 98 % | BODY MASS INDEX: 43.48 KG/M2 | RESPIRATION RATE: 16 BRPM

## 2018-10-21 LAB
ANION GAP SERPL CALC-SCNC: 10 MMOL/L (ref 0–11.9)
BASOPHILS # BLD AUTO: 0.5 % (ref 0–1.8)
BASOPHILS # BLD: 0.06 K/UL (ref 0–0.12)
BUN SERPL-MCNC: 7 MG/DL (ref 8–22)
CALCIUM SERPL-MCNC: 9.1 MG/DL (ref 8.5–10.5)
CHLORIDE SERPL-SCNC: 105 MMOL/L (ref 96–112)
CO2 SERPL-SCNC: 24 MMOL/L (ref 20–33)
CREAT SERPL-MCNC: 0.7 MG/DL (ref 0.5–1.4)
EOSINOPHIL # BLD AUTO: 0.46 K/UL (ref 0–0.51)
EOSINOPHIL NFR BLD: 3.7 % (ref 0–6.9)
ERYTHROCYTE [DISTWIDTH] IN BLOOD BY AUTOMATED COUNT: 41.4 FL (ref 35.9–50)
GLUCOSE SERPL-MCNC: 163 MG/DL (ref 65–99)
HCT VFR BLD AUTO: 35.1 % (ref 37–47)
HGB BLD-MCNC: 11.7 G/DL (ref 12–16)
IMM GRANULOCYTES # BLD AUTO: 0.07 K/UL (ref 0–0.11)
IMM GRANULOCYTES NFR BLD AUTO: 0.6 % (ref 0–0.9)
LYMPHOCYTES # BLD AUTO: 1.88 K/UL (ref 1–4.8)
LYMPHOCYTES NFR BLD: 15.2 % (ref 22–41)
MCH RBC QN AUTO: 29.4 PG (ref 27–33)
MCHC RBC AUTO-ENTMCNC: 33.3 G/DL (ref 33.6–35)
MCV RBC AUTO: 88.2 FL (ref 81.4–97.8)
MONOCYTES # BLD AUTO: 0.8 K/UL (ref 0–0.85)
MONOCYTES NFR BLD AUTO: 6.5 % (ref 0–13.4)
NEUTROPHILS # BLD AUTO: 9.06 K/UL (ref 2–7.15)
NEUTROPHILS NFR BLD: 73.5 % (ref 44–72)
NRBC # BLD AUTO: 0 K/UL
NRBC BLD-RTO: 0 /100 WBC
PLATELET # BLD AUTO: 289 K/UL (ref 164–446)
PMV BLD AUTO: 11.4 FL (ref 9–12.9)
POTASSIUM SERPL-SCNC: 3.5 MMOL/L (ref 3.6–5.5)
RBC # BLD AUTO: 3.98 M/UL (ref 4.2–5.4)
SODIUM SERPL-SCNC: 139 MMOL/L (ref 135–145)
WBC # BLD AUTO: 12.3 K/UL (ref 4.8–10.8)

## 2018-10-21 PROCEDURE — 700111 HCHG RX REV CODE 636 W/ 250 OVERRIDE (IP): Performed by: INTERNAL MEDICINE

## 2018-10-21 PROCEDURE — 99239 HOSP IP/OBS DSCHRG MGMT >30: CPT | Performed by: INTERNAL MEDICINE

## 2018-10-21 PROCEDURE — 36415 COLL VENOUS BLD VENIPUNCTURE: CPT

## 2018-10-21 PROCEDURE — 700105 HCHG RX REV CODE 258: Performed by: INTERNAL MEDICINE

## 2018-10-21 PROCEDURE — 85025 COMPLETE CBC W/AUTO DIFF WBC: CPT

## 2018-10-21 PROCEDURE — 82962 GLUCOSE BLOOD TEST: CPT

## 2018-10-21 PROCEDURE — A9270 NON-COVERED ITEM OR SERVICE: HCPCS | Performed by: INTERNAL MEDICINE

## 2018-10-21 PROCEDURE — 700102 HCHG RX REV CODE 250 W/ 637 OVERRIDE(OP): Performed by: INTERNAL MEDICINE

## 2018-10-21 PROCEDURE — 80048 BASIC METABOLIC PNL TOTAL CA: CPT

## 2018-10-21 RX ORDER — SODIUM HYPOCHLORITE 1.25 MG/ML
20 SOLUTION TOPICAL 2 TIMES DAILY
Qty: 600 ML | Refills: 0 | Status: SHIPPED | OUTPATIENT
Start: 2018-10-21 | End: 2018-11-05

## 2018-10-21 RX ORDER — DOXYCYCLINE 100 MG/1
100 CAPSULE ORAL 2 TIMES DAILY
Qty: 78 CAP | Refills: 0 | Status: SHIPPED | OUTPATIENT
Start: 2018-10-21 | End: 2018-10-21

## 2018-10-21 RX ORDER — LISINOPRIL 20 MG/1
20 TABLET ORAL DAILY
Qty: 30 TAB | Refills: 2 | Status: SHIPPED | OUTPATIENT
Start: 2018-10-22 | End: 2018-10-21

## 2018-10-21 RX ORDER — AMOXICILLIN AND CLAVULANATE POTASSIUM 875; 125 MG/1; MG/1
1 TABLET, FILM COATED ORAL 2 TIMES DAILY
Qty: 78 TAB | Refills: 0 | Status: SHIPPED | OUTPATIENT
Start: 2018-10-21 | End: 2018-10-21

## 2018-10-21 RX ORDER — DOXYCYCLINE 100 MG/1
100 CAPSULE ORAL 2 TIMES DAILY
Qty: 78 CAP | Refills: 0 | Status: SHIPPED | OUTPATIENT
Start: 2018-10-21 | End: 2018-11-29

## 2018-10-21 RX ORDER — SODIUM HYPOCHLORITE 1.25 MG/ML
20 SOLUTION TOPICAL 2 TIMES DAILY
Qty: 600 ML | Refills: 0 | Status: SHIPPED | OUTPATIENT
Start: 2018-10-21 | End: 2018-10-21

## 2018-10-21 RX ORDER — AMOXICILLIN AND CLAVULANATE POTASSIUM 875; 125 MG/1; MG/1
1 TABLET, FILM COATED ORAL 2 TIMES DAILY
Qty: 78 TAB | Refills: 0 | Status: SHIPPED | OUTPATIENT
Start: 2018-10-21 | End: 2018-11-29

## 2018-10-21 RX ORDER — LISINOPRIL 20 MG/1
20 TABLET ORAL DAILY
Qty: 30 TAB | Refills: 2 | Status: SHIPPED | OUTPATIENT
Start: 2018-10-22

## 2018-10-21 RX ADMIN — POTASSIUM CHLORIDE 20 MEQ: 1500 TABLET, EXTENDED RELEASE ORAL at 05:43

## 2018-10-21 RX ADMIN — METFORMIN HYDROCHLORIDE 850 MG: 850 TABLET ORAL at 08:36

## 2018-10-21 RX ADMIN — AMPICILLIN SODIUM AND SULBACTAM SODIUM 3 G: 2; 1 INJECTION, POWDER, FOR SOLUTION INTRAMUSCULAR; INTRAVENOUS at 08:37

## 2018-10-21 RX ADMIN — AMPICILLIN SODIUM AND SULBACTAM SODIUM 3 G: 2; 1 INJECTION, POWDER, FOR SOLUTION INTRAMUSCULAR; INTRAVENOUS at 02:37

## 2018-10-21 RX ADMIN — METOPROLOL TARTRATE 25 MG: 25 TABLET, FILM COATED ORAL at 05:44

## 2018-10-21 RX ADMIN — LEVOTHYROXINE SODIUM 200 MCG: 50 TABLET ORAL at 05:43

## 2018-10-21 RX ADMIN — SODIUM CHLORIDE: 9 INJECTION, SOLUTION INTRAVENOUS at 05:43

## 2018-10-21 RX ADMIN — LISINOPRIL 20 MG: 20 TABLET ORAL at 05:44

## 2018-10-21 RX ADMIN — DAKIN'S SOLUTION 0.125% (QUARTER STRENGTH) 473 ML: 0.12 SOLUTION at 05:52

## 2018-10-21 RX ADMIN — OMEPRAZOLE 20 MG: 20 CAPSULE, DELAYED RELEASE ORAL at 05:43

## 2018-10-21 ASSESSMENT — PAIN SCALES - GENERAL: PAINLEVEL_OUTOF10: 0

## 2018-10-21 NOTE — PROGRESS NOTES
Patient has been discharged.    Reviewed chart and image of the left ring finger from today.  Swelling has markedly improved, some erythema remains, open wound adjacent to PIP and over distal phalanx, now without eschar.    Continue original plan of p.o. doxycycline 100 mg twice daily (picked over Bactrim since patient also has diabetes and is on an ACE inhibitor) + Augmentin 875 mg twice daily for 4-6 weeks with close outpatient ID and orthopedic follow-up.  Will need surgical intervention if no improvement or worsening.  Tentative antibiotic stop date of 11/29/2018.    ID clinic and Ortho follow-up in 1-2 weeks.

## 2018-10-21 NOTE — PROGRESS NOTES
Pt aox4. No visible signs of distress. Hypertensive, but otherwise VSS.  Tolerating IV antibiotics therapy without any signs or symptoms of adverse reactions.  No complaints of pain.  On room air without any SOB.  Tolerating diet without any n/v. No signs or symptoms of hypoglycemia.  Dressing to left 4th digit CDI. Will change this shift.  Bed locked and in low position.  Ambulatory by self.  Call light within reach and able to make all needs be known.  Will continue to monitor.

## 2018-10-21 NOTE — PROGRESS NOTES
Assumed care of patient from night shift RN  38 year old female  Full code  Admitted 10/17 d/t cellulitis/ abcess of left ring finger  Patient A&O x 4  RA  Cardiac WNL  Last BM 10/20  Voiding appropriately  Diabetic diet  20 g R forearm with NS at 100  Skin: wound to left ring finger with dry gauze and coban, dressing change BID  Up self  No fall risk per jono croft  Plan: D/c home today on oral abx  All questions answered and all needs met at this time. Bed in low locked position. Call light within reach and patient verbalized understanding of proper use. RN will implement hourly rounding and continue to answer call lights appropriately.

## 2018-10-21 NOTE — PROGRESS NOTES
Dr. Peter updated that pt's blood pressure is 173/105. Dr. Peter updated that per pt she takes Lisinopril 25 mg once a day. Per Dr. Peter orders received to start Lisinopril 20 mg.

## 2018-10-21 NOTE — PROGRESS NOTES
"Progress Note     Interval changes:Feels much better. No fevers or chills.      ROS - Patient denies any new issues. No chest pain, dyspnea, or fever.  Pain well controlled.   /81   Pulse 83   Temp 36.1 °C (97 °F)   Resp 18   Ht 1.6 m (5' 3\")   Wt 111.3 kg (245 lb 6 oz)   LMP 09/15/2018 (Exact Date)   SpO2 96%   Breastfeeding? No   BMI 43.47 kg/m²      Patient seen and examined  No acute distress  Breathing non labored  RRR  Left ring figner: decreased erythema, no drainage, no fluctuance. Decreased swelling over proximal phalanx.    SILT r/u digital nerve  Stable wound over distal phalanx  wwp finger  Recent Labs      10/19/18   0250  10/21/18   0258   WBC  13.0*  12.3*   RBC  4.03*  3.98*   HEMOGLOBIN  11.9*  11.7*   HEMATOCRIT  35.5*  35.1*   MCV  88.1  88.2   MCH  29.5  29.4   RDW  41.0  41.4   PLATELETCT  304  289   MPV  11.1  11.4   NEUTSPOLYS  72.70*  73.50*   LYMPHOCYTES  15.70*  15.20*   MONOCYTES  5.70  6.50   EOSINOPHILS  4.70  3.70   BASOPHILS  0.50  0.50     Recent Labs      10/19/18   0250  10/20/18   0705  10/21/18   0258   SODIUM  136  136  139   POTASSIUM  3.7  3.9  3.5*   CHLORIDE  102  104  105   CO2  27  24  24   GLUCOSE  211*  192*  163*   BUN  11  9  7*        A/P: Left ring finger osteomyelitis, s/p I and D  - continue wound care  -PO antibiotics  Pain control  No surgical intervention at this time.   Would recommend wound care in [employer], along with nutritionist for diabetes guidance  -Can follow up with me in 1-2 weeks, or with someone in [employer].   At this time, ok for [place] when comfortable from other teams involved.   -Please call with questions       Patient Active Problem List   Diagnosis   • Osteomyelitis (HCC)   • Type 2 diabetes mellitus, without long-term current use of insulin (HCC)   • Hypothyroidism   • Obesity   • GERD (gastroesophageal reflux disease)     "

## 2018-10-21 NOTE — DISCHARGE SUMMARY
Discharge Summary    CHIEF COMPLAINT ON ADMISSION  No chief complaint on file.      Reason for Admission  Osteomyelitis     Admission Date  10/17/2018    CODE STATUS  Full Code    HPI & HOSPITAL COURSE  This is a 38 y.o. female here with ring finger osteomyelitis with uncontrolled diabetes.  He has been started on IV antibiotics with improvement of finger wound appearance.  Blood cultures remain negative.  She is noted to have uncontrolled diabetes.  Metformin has been adjusted with improved blood sugar control.  Patient is advised to continue close blood sugar monitoring and adjust oral hypoglycemics as needed with her primary care provider for better blood sugar control due to current infection.  He has been seen by hand surgery with recommendation for amputation.  However patient would like to continue conservative therapy including continued extended course of antibiotics for osteomyelitis.  She has been seen by ID with recommendation to continue oral antibiotics through November 29 and extend as needed.    She has been educated by surgery, to continue Dakin's solution and wound dressing changes.  As per surgery, finger wound appears to be improving with decreasing discharge.       Therefore, she is discharged in good and stable condition to home with close outpatient follow-up.    The patient met 2-midnight criteria for an inpatient stay at the time of discharge.    Discharge Date  10/21/18    FOLLOW UP ITEMS POST DISCHARGE  Discharge to home  Follow-up with primary care provider/discharge clinic in 1 week  Follow-up with ID in 2-3 weeks  Follow-up with surgery as scheduled  Augmentin/doxycycline through November 29, 2018    DISCHARGE DIAGNOSES  Active Problems:    Osteomyelitis (HCC) POA: Yes    Type 2 diabetes mellitus, without long-term current use of insulin (HCC) POA: Yes    Hypothyroidism POA: Yes    Obesity POA: Yes    GERD (gastroesophageal reflux disease) POA: Yes  Resolved Problems:    * No resolved  hospital problems. *      FOLLOW UP  No future appointments.  Primary Care Provider    In 1 week        MEDICATIONS ON DISCHARGE     Medication List      START taking these medications      Instructions   amoxicillin-clavulanate 875-125 MG Tabs  Commonly known as:  AUGMENTIN   Take 1 Tab by mouth 2 times a day for 39 days.  Dose:  1 Tab     dakins 0.125% (1/4 strength) 0.125 % Soln   Apply 20 mL to affected area(s) 2 Times a Day for 15 days.  Dose:  20 mL     doxycycline 100 MG capsule  Commonly known as:  MONODOX   Take 1 Cap by mouth 2 times a day for 39 days.  Dose:  100 mg     lisinopril 20 MG Tabs  Commonly known as:  PRINIVIL   Take 1 Tab by mouth every day.  Dose:  20 mg     metoprolol 25 MG Tabs  Commonly known as:  LOPRESSOR   Take 1 Tab by mouth 2 Times a Day.  Dose:  25 mg        CHANGE how you take these medications      Instructions   metFORMIN 850 MG Tabs  What changed:  · medication strength  · how much to take  · when to take this  Commonly known as:  GLUCOPHAGE   Take 1 Tab by mouth 3 times a day, with meals.  Dose:  850 mg        CONTINUE taking these medications      Instructions   levothyroxine 100 MCG Tabs  Commonly known as:  SYNTHROID   Take 200 mcg by mouth Every morning on an empty stomach.  Dose:  200 mcg     omeprazole 20 MG delayed-release capsule  Commonly known as:  PRILOSEC   Take 20 mg by mouth every day. Before breakfast  Dose:  20 mg     spironolactone 25 MG Tabs  Commonly known as:  ALDACTONE   Take 25 mg by mouth every day.  Dose:  25 mg        STOP taking these medications    metroNIDAZOLE 500 MG Tabs  Commonly known as:  FLAGYL            Allergies  No Known Allergies    DIET  Orders Placed This Encounter   Procedures   • Diet Order Diabetic, Regular     Standing Status:   Standing     Number of Occurrences:   1     Order Specific Question:   Diet:     Answer:   Diabetic [3]     Order Specific Question:   Diet:     Answer:   Regular [1]       ACTIVITY  As tolerated.  Weight  bearing as tolerated    CONSULTATIONS  Hand surgery  ID    PROCEDURES  None    LABORATORY  Lab Results   Component Value Date    SODIUM 139 10/21/2018    POTASSIUM 3.5 (L) 10/21/2018    CHLORIDE 105 10/21/2018    CO2 24 10/21/2018    GLUCOSE 163 (H) 10/21/2018    BUN 7 (L) 10/21/2018    CREATININE 0.70 10/21/2018        Lab Results   Component Value Date    WBC 12.3 (H) 10/21/2018    HEMOGLOBIN 11.7 (L) 10/21/2018    HEMATOCRIT 35.1 (L) 10/21/2018    PLATELETCT 289 10/21/2018        Total time of the discharge process exceeds 45 minutes.

## 2018-10-21 NOTE — PROGRESS NOTES
Infectious Disease Progress Note    Author: Ignacio Long M.D. Date & Time of service: 10/20/2018  5:19 PM    Chief Complaint:  Follow-up of left ring finger osteomyelitis    Interval History:  10/19 afebrile, white count 13,000.  Patient reports feeling about the same.  Orthopedics watching.  10/20 patient reports significant improvement of the finger pain.  Surgery is happy with progression.  They are okay with discharge and plan to see patient back in 1-2 weeks outpatient.    Labs Reviewed, Medications Reviewed and Wound Reviewed.    Review of Systems:  Review of Systems   Constitutional: Negative for chills, diaphoresis and fever.   Respiratory: Negative for cough and shortness of breath.    Cardiovascular: Negative for chest pain and leg swelling.   Gastrointestinal: Negative for abdominal pain, diarrhea, nausea and vomiting.   Genitourinary: Negative for dysuria.   Musculoskeletal: Positive for joint pain ( Improved).   Skin: Negative for rash.   Neurological: Negative for weakness.   All other systems reviewed and are negative.      Hemodynamics:  Temp (24hrs), Av.6 °C (97.8 °F), Min:36.3 °C (97.4 °F), Max:36.7 °C (98 °F)  Temperature: 36.7 °C (98 °F)  Pulse  Av.6  Min: 66  Max: 90   Blood Pressure: 146/92       Physical Exam:  Physical Exam   Constitutional: She is oriented to person, place, and time. She appears well-developed and well-nourished. No distress.   HENT:   Head: Normocephalic and atraumatic.   Nose: Nose normal.   Mouth/Throat: No oropharyngeal exudate.   Eyes: Pupils are equal, round, and reactive to light. Conjunctivae and EOM are normal. Right eye exhibits no discharge. Left eye exhibits no discharge. No scleral icterus.   Neck: Neck supple. No JVD present.   Cardiovascular: Normal rate, regular rhythm, normal heart sounds and intact distal pulses.  Exam reveals no gallop and no friction rub.    No murmur heard.  Pulmonary/Chest: Effort normal and breath sounds normal. No  respiratory distress. She has no wheezes. She has no rales.   Abdominal: Soft. Bowel sounds are normal. She exhibits no distension. There is no tenderness. There is no rebound.   Musculoskeletal: Normal range of motion. She exhibits no edema.   Left ring finger swollen red, open wound with eschar noted on 10/19, did not reopen dressing today   Lymphadenopathy:     She has no cervical adenopathy.   Neurological: She is alert and oriented to person, place, and time.   No gross focal neural or cranial nerve deficit   Skin: Skin is warm and dry.   Psychiatric: She has a normal mood and affect. Her behavior is normal.   Pleasant   Nursing note and vitals reviewed.      Meds:    Current Facility-Administered Medications:   •  metFORMIN  •  vancomycin  •  lisinopril  •  MD Alert...Vancomycin per Pharmacy  •  ampicillin-sulbactam (UNASYN) IV  •  potassium chloride SA  •  senna-docusate **AND** polyethylene glycol/lytes **AND** magnesium hydroxide **AND** bisacodyl  •  Respiratory Care per Protocol  •  NS  •  acetaminophen  •  Notify provider if pain remains uncontrolled **AND** Use the numeric rating scale (NRS-11) on regular floors and Critical-Care Pain Observation Tool (CPOT) on ICUs/Trauma to assess pain **AND** Pulse Ox (Oximetry) **AND** Pharmacy Consult Request **AND** If patient difficult to arouse and/or has respiratory depression, stop any opiates that are currently infusing and call a Rapid Response. **AND** oxyCODONE immediate-release **AND** oxyCODONE immediate-release **AND** morphine injection  •  insulin regular **AND** Accu-Chek Q6 if NPO **AND** NOTIFY MD and PharmD **AND** glucose 4 g **AND** dextrose 50%  •  levothyroxine  •  omeprazole  •  dakins 0.125% (1/4 strength)    Labs:  Recent Labs      10/18/18   0347  10/19/18   0250   WBC  12.7*  13.0*   RBC  4.03*  4.03*   HEMOGLOBIN  11.3*  11.9*   HEMATOCRIT  35.3*  35.5*   MCV  87.6  88.1   MCH  28.0  29.5   RDW  40.6  41.0   PLATELETCT  312  304   MPV   "10.9  11.1   NEUTSPOLYS  72.30*  72.70*   LYMPHOCYTES  17.40*  15.70*   MONOCYTES  5.70  5.70   EOSINOPHILS  3.20  4.70   BASOPHILS  0.50  0.50     Recent Labs      10/18/18   0347  10/19/18   0250  10/20/18   0705   SODIUM  136  136  136   POTASSIUM  3.5*  3.7  3.9   CHLORIDE  100  102  104   CO2  28  27  24   GLUCOSE  154*  211*  192*   BUN  9  11  9     Recent Labs      10/18/18   0347  10/19/18   0250  10/20/18   0705   CREATININE  0.71  0.71  0.79       Imaging:  Dx-hand 3+ Left    Result Date: 10/17/2018  10/17/2018 9:52 PM HISTORY/REASON FOR EXAM: Atraumatic Pain/Swelling/Deformity TECHNIQUE/EXAM DESCRIPTION:  AP, lateral, and oblique views of the LEFT hand. COMPARISON:  None. FINDINGS: There appears to be slight bony resorption of the ring finger tuft. Soft tissue swelling of the ring finger is seen. There is 5.5 mm and 2.1 mm peripherally sclerotic lytic lesion in the scaphoid. Mild ulna minus variant is seen.     1.  Slight bony resorption and osteopenia of the distal phalanx of the ring finger, appearance favors osteomyelitis. 2.  Peripherally sclerotic centrally lytic lesions within the scaphoid, could are present bone cyst, giant cell tumor, or other bony lesion. 3.  Mild ulna minus variant      Micro:  Results     Procedure Component Value Units Date/Time    BLOOD CULTURE [[id number]] Collected:  10/18/18 0347    Order Status:  Completed Specimen:  Blood from Peripheral Updated:  10/19/18 0753     Significant Indicator NEG     Source BLD     Site PERIPHERAL     Blood Culture No Growth    Note: Blood cultures are incubated for 5 days and  are monitored continuously.Positive blood cultures  are called to the RN and reported as soon as  they are identified.      Narrative:       Per Hospital Policy: Only change Specimen Src: to \"Line\" if  specified by physician order.    BLOOD CULTURE [[id number]] Collected:  10/18/18 0347    Order Status:  Completed Specimen:  Blood from Peripheral Updated:  10/19/18 0753 " "    Significant Indicator NEG     Source BLD     Site PERIPHERAL     Blood Culture No Growth    Note: Blood cultures are incubated for 5 days and  are monitored continuously.Positive blood cultures  are called to the RN and reported as soon as  they are identified.      Narrative:       Per Hospital Policy: Only change Specimen Src: to \"Line\" if  specified by physician order.          Assessment:  Fabi Rudd is a 38 y.o. female with a history of diabetes, right ring finger abscess with underlying acute distal phalanx osteomyelitis.  Refuses amputation.  Outside wound cultures growing methicillin-resistant Staphylococcus hominis.    Active Hospital Problems    Diagnosis   • Osteomyelitis (HCC) [M86.9]   • Type 2 diabetes mellitus, without long-term current use of insulin (HCC) [E11.9]   • Hypothyroidism [E03.9]   • Obesity [E66.9]   • GERD (gastroesophageal reflux disease) [K21.9]     Plan:  Left ring finger osteomyelitis and abscess  -Since this is an acute osteomyelitis limited to the distal digit, will can treat with 4-6 weeks of p.o. antibiotics to cover the grown organisms in addition to other common suspects and anaerobes  -Switched to IV antibiotics while inpatient given slow response -vancomycin and Unasyn 3 g every 6 hours  -Tentative stop date 11/29/2018  -Monitor. If not improving, she will need surgical source control  -If getting discharged, switch to p.o. Bactrim 2DS tabs BID  (versus doxycycline 100 mg twice daily) and Augmentin 875 mg p.o. BID  -Lisinopril started today.  This would be high risk with high-dose Bactrim for hyperkalemia and VENECIA. There is more data with Bactrim for osteomyelitis but doxycycline gets adequate bone concentrations as well.  Would prefer to use doxycycline instead of Bactrim if lisinopril will be continued  -Patient counseled to return to the ER if finger fails to improve or worsens, in which case she will need surgical intervention    Diabetes  Poorly controlled, hemoglobin " A1c of 11.9  Glucose control would be essential for wound healing and resolution of infection  Patient counseled regarding this    Discussed with internal medicine.    ID will follow.  Please call with questions.

## 2018-10-21 NOTE — DISCHARGE INSTRUCTIONS
Discharge Instructions    Discharged to home by car with relative. Discharged via wheelchair, hospital escort: Yes.  Special equipment needed: Not Applicable    Be sure to schedule a follow-up appointment with your primary care doctor or any specialists as instructed.     Discharge Plan:   Smoking Cessation Offered:  (n/a)  Influenza Vaccine Indication: Patient Refuses    I understand that a diet low in cholesterol, fat, and sodium is recommended for good health. Unless I have been given specific instructions below for another diet, I accept this instruction as my diet prescription.   Other diet: diabetic diet    Special Instructions: None    · Is patient discharged on Warfarin / Coumadin?   No     Depression / Suicide Risk    As you are discharged from this [facility] facility, it is important to learn how to keep safe from harming yourself.    Recognize the warning signs:  · Abrupt changes in personality, positive or negative- including increase in energy   · Giving away possessions  · Change in eating patterns- significant weight changes-  positive or negative  · Change in sleeping patterns- unable to sleep or sleeping all the time   · Unwillingness or inability to communicate  · Depression  · Unusual sadness, discouragement and loneliness  · Talk of wanting to die  · Neglect of personal appearance   · Rebelliousness- reckless behavior  · Withdrawal from people/activities they love  · Confusion- inability to concentrate     If you or a loved one observes any of these behaviors or has concerns about self-harm, here's what you can do:  · Talk about it- your feelings and reasons for harming yourself  · Remove any means that you might use to hurt yourself (examples: pills, rope, extension cords, firearm)  · Get professional help from the community (Mental Health, Substance Abuse, psychological counseling)  · Do not be alone:Call your Safe Contact- someone whom you trust who will be there for you.  · Call your  local CRISIS HOTLINE 854-5866 or 110-708-3559  · Call your local Children's Mobile Crisis Response Team [place] (754) 369-1156 or www.[employer]  · Call the toll free National Suicide Prevention Hotlines   · National Suicide Prevention Lifeline 062-903-TDRR (0923)  · National Hope Line Network 800-SUICIDE (717-4762)    Follow-up with primary care provider/discharge clinic in 1 week   Follow-up with ID in 2-3 weeks   Follow-up with surgery as scheduled   Augmentin/doxycycline through November 29, 2018

## 2018-10-21 NOTE — PROGRESS NOTES
Dr. Peter updated that pt's blood pressure is 171/102 despite lisinopril being given. Per Dr. Peter metoprolol 25 mg BID added and Labetalol 10 mg Q6H PRN for diastolic >100 and systolic > 160 ordered.

## 2018-10-21 NOTE — PROGRESS NOTES
Patient educated on discharge instructions including medication regimen, follow up appointments and wound care. Patient verbalized understanding. VSS, wound dressing CDI, patient ambulating by self and no complaints of pain. Patient has all prescriptions in hand and a bottle of dakins at bedside. Patient verbalizes how to get prescriptions filled. IV removed, catheter intact, pt tolerated well. Patient refusing wheelchair and requests to walk to car with . All questions answered and all needs met at time of discharge.

## 2018-10-22 LAB — GLUCOSE BLD-MCNC: 174 MG/DL (ref 65–99)

## 2018-10-23 ENCOUNTER — TELEPHONE (OUTPATIENT)
Dept: INFECTIOUS DISEASES | Facility: MEDICAL CENTER | Age: 38
End: 2018-10-23

## 2018-10-23 LAB
BACTERIA BLD CULT: NORMAL
BACTERIA BLD CULT: NORMAL
SIGNIFICANT IND 70042: NORMAL
SIGNIFICANT IND 70042: NORMAL
SITE SITE: NORMAL
SITE SITE: NORMAL
SOURCE SOURCE: NORMAL
SOURCE SOURCE: NORMAL

## 2018-10-23 NOTE — TELEPHONE ENCOUNTER
Returned Call to pt and LM to return my call to schedule a hospital follow up appointment.  -AMP

## 2018-10-30 ENCOUNTER — OFFICE VISIT (OUTPATIENT)
Dept: INFECTIOUS DISEASES | Facility: MEDICAL CENTER | Age: 38
End: 2018-10-30
Payer: MEDICAID

## 2018-10-30 VITALS
HEIGHT: 63 IN | TEMPERATURE: 97.4 F | WEIGHT: 211 LBS | HEART RATE: 89 BPM | BODY MASS INDEX: 37.39 KG/M2 | OXYGEN SATURATION: 97 % | DIASTOLIC BLOOD PRESSURE: 86 MMHG | SYSTOLIC BLOOD PRESSURE: 138 MMHG

## 2018-10-30 DIAGNOSIS — M86.00 ACUTE HEMATOGENOUS OSTEOMYELITIS, UNSPECIFIED SITE (HCC): ICD-10-CM

## 2018-10-30 DIAGNOSIS — E11.628 TYPE 2 DIABETES MELLITUS WITH OTHER SKIN COMPLICATION, WITHOUT LONG-TERM CURRENT USE OF INSULIN (HCC): ICD-10-CM

## 2018-10-30 PROCEDURE — 99214 OFFICE O/P EST MOD 30 MIN: CPT | Performed by: NURSE PRACTITIONER

## 2018-10-30 NOTE — PROGRESS NOTES
"Subjective:     Chief Complaint   Patient presents with   • Hospital Follow-up     left ring finger osteomyelitis     Infectious Disease clinic follow up  Echo Young 38 y.o.female in clinic today for evaluation and management of Finger osteomyelitis. Primary care provider: MERLIN Bryan. History of arthritis, hypothyroid, and poorly controlled diabetes.  Pt reporting BS . HgbA1C 11.9 (10/18/18). Pt currently residing in [place], [place]    Interval History: pt hospitalized 10/17/18-10/21/18 due to left ring finger osteomyelitis.     Pt presented to [facility] on 10/12/18 with 2 weeks of right ring finger swelling that patient reports began with a hangnail.  She put her on amoxicillin which resulted in no improvement.  Patient reported that the swelling extended approximately up 2/3s of her forearm.  A limited I&D was performed in the ER in [place] on 10/12/18.  She refused surgery referral at the time.  MRI showed abscess of the finger with underlying osteomyelitis.  She was then transferred to [facility] due to concern that she may need amputation.  Patient was seen by Ortho.  Patient prefers to not undergo amputation.  Wound culture from the outside hospital grew Staphylococcus hominis.  She was discharged on p.o. doxycycline 100 mg twice daily (picked over Bactrim since patient also has diabetes and is on an ACE inhibitor) + Augmentin 875 mg twice daily for 4-6 weeks with close outpatient ID and orthopedic follow-up.  Will need surgical intervention if no improvement or worsening.  Tentative antibiotic stop date of 11/29/2018.    Hospital records reviewed    Today 10/30/18: Patient reports feeling well. Pt stating that the wound is healing well. She is happy with the progress and reports improvement in redness, swelling, and pain. She states the pain is much improved, but she does occasionally have an \"aching\" feeling when outside in the cold weather.  Denies drainage, pungent odor, " "redness, pain. She is followed closely by [facility]. Denies feeling generally ill, fevers/chills, general malaise, headache, n/v/d. She states that she is taking Doxycyline and Augmentin as prescribed without adverse effect.     Past Medical History:   Diagnosis Date   • Diabetes mellitus (HCC)        Allergies: Patient has no known allergies.    Current medications and problem list reviewed with patient and updated in EPIC.    ROS  As documented above in my HPI     Objective:     PE:  /86   Pulse 89   Temp 36.3 °C (97.4 °F) (Temporal)   Ht 1.6 m (5' 3\")   Wt 95.7 kg (211 lb)   SpO2 97%   BMI 37.38 kg/m²      Vital signs reviewed  Constitutional: patient is oriented to person, place, and time. Appears well-developed and well-nourished. No distress  Eyes: Conjunctivae normal and EOM are normal. Pupils are equal, round, and reactive to light.   Mouth/Throat: Lips without lesions, good dentition, oropharynx is clear and moist.  Neck: Trachea midline. Normal range of motion. Neck supple. No masses  Cardiovascular: Normal rate, regular rhythm, normal heart sounds and intact distal pulses. No murmur, gallop, or friction rub. No edema.  Pulmonary/Chest: No respiratory distress. Unlabored respiratory effort, lungs clear to auscultation. No wheezes or rales.   Abdominal: Soft, non tender. BS + x 4. No masses or hepatosplenomegaly.   Musculoskeletal: Normal range of motion. No tenderness, swelling, erythema, deformity noted.  Neurological: He is alert and oriented to person, place, and time. No cranial nerve deficit. Coordination normal.   Skin: Skin is warm and dry. Good turgor. No rashes visable.  Right ring finger: 2 ulcerations with some yellow eschar.  Mild associated erythema and swelling  Psychiatric: Normal mood and affect. Behavior is normal.       Assessment and Plan:   The following treatment plan was discussed with patient at length  1. Acute hematogenous osteomyelitis, unspecified site " (HCC)     2. Type 2 diabetes mellitus with other skin complication, without long-term current use of insulin (HCC)       Continue oral Doxy and Augmentin as prescribed. Discussed dosing/side effects. Pt instructed to call clinic with any adverse effects  Take Doxycycline with non dairy food BID. Avoid direct sunlight/wear sunscreen.   Continue FU with Ortho as scheduled  Discussed importance of glucose control and compliance with DM treatment regimen and diet modifications in regards to infection and wound healing.     Follow up: 2 weeks, RTC sooner if needed. FU with PCP for ongoing chronic medical conditions.

## 2018-11-06 PROBLEM — M86.00 ACUTE HEMATOGENOUS OSTEOMYELITIS (HCC): Status: ACTIVE | Noted: 2018-11-06

## 2018-11-20 ENCOUNTER — DOCUMENTATION (OUTPATIENT)
Dept: INFECTIOUS DISEASES | Facility: MEDICAL CENTER | Age: 38
End: 2018-11-20